# Patient Record
Sex: FEMALE | Race: BLACK OR AFRICAN AMERICAN | NOT HISPANIC OR LATINO | URBAN - METROPOLITAN AREA
[De-identification: names, ages, dates, MRNs, and addresses within clinical notes are randomized per-mention and may not be internally consistent; named-entity substitution may affect disease eponyms.]

---

## 2017-01-09 ENCOUNTER — EMERGENCY (EMERGENCY)
Facility: HOSPITAL | Age: 22
LOS: 1 days | Discharge: LEFT BEFORE DISCHARGE | End: 2017-01-09
Attending: EMERGENCY MEDICINE
Payer: MEDICAID

## 2017-01-09 VITALS
RESPIRATION RATE: 24 BRPM | HEART RATE: 91 BPM | DIASTOLIC BLOOD PRESSURE: 77 MMHG | TEMPERATURE: 98 F | OXYGEN SATURATION: 97 % | SYSTOLIC BLOOD PRESSURE: 118 MMHG

## 2017-01-09 DIAGNOSIS — R10.9 UNSPECIFIED ABDOMINAL PAIN: ICD-10-CM

## 2017-01-09 DIAGNOSIS — R06.4 HYPERVENTILATION: ICD-10-CM

## 2017-01-09 LAB
APPEARANCE UR: CLEAR — SIGNIFICANT CHANGE UP
BACTERIA # UR AUTO: ABNORMAL
BILIRUB UR-MCNC: NEGATIVE — SIGNIFICANT CHANGE UP
COLOR SPEC: YELLOW — SIGNIFICANT CHANGE UP
COMMENT - URINE: SIGNIFICANT CHANGE UP
DIFF PNL FLD: ABNORMAL
EPI CELLS # UR: SIGNIFICANT CHANGE UP
GLUCOSE UR QL: NEGATIVE MG/DL — SIGNIFICANT CHANGE UP
HCG UR QL: NEGATIVE — SIGNIFICANT CHANGE UP
KETONES UR-MCNC: ABNORMAL
LEUKOCYTE ESTERASE UR-ACNC: ABNORMAL
NITRITE UR-MCNC: NEGATIVE — SIGNIFICANT CHANGE UP
PH UR: 7 — SIGNIFICANT CHANGE UP (ref 4.8–8)
PROT UR-MCNC: 15 MG/DL
RBC CASTS # UR COMP ASSIST: ABNORMAL /HPF (ref 0–4)
SP GR SPEC: 1.01 — SIGNIFICANT CHANGE UP (ref 1.01–1.02)
UROBILINOGEN FLD QL: 4 MG/DL
WBC UR QL: SIGNIFICANT CHANGE UP

## 2017-01-09 PROCEDURE — 81001 URINALYSIS AUTO W/SCOPE: CPT

## 2017-01-09 PROCEDURE — 81025 URINE PREGNANCY TEST: CPT

## 2017-01-09 RX ORDER — ONDANSETRON 8 MG/1
4 TABLET, FILM COATED ORAL ONCE
Qty: 0 | Refills: 0 | Status: COMPLETED | OUTPATIENT
Start: 2017-01-09 | End: 2017-01-09

## 2017-01-09 RX ADMIN — ONDANSETRON 4 MILLIGRAM(S): 8 TABLET, FILM COATED ORAL at 02:25

## 2017-01-09 NOTE — ED PROVIDER NOTE - OBJECTIVE STATEMENT
20 y/o female with no pertinent medical hx presents to the ED c/o abd pain that onset today. Pt notes that she ate oat meal in the morning and had one episode of vomiting. Notes that sx improved mildly after episode of vomiting. Still notes mild abd pain. Denies numbness, tingling, fever, HA or chills. No further complaints at this time.

## 2017-01-09 NOTE — ED PROVIDER NOTE - MEDICAL DECISION MAKING DETAILS
Pt offered IV labs. Pt notes that she wishes to try PO medication first. Will UA, medicate and re-eval.

## 2017-01-09 NOTE — ED PROVIDER NOTE - NS ED MD SCRIBE ATTENDING SCRIBE SECTIONS
HISTORY OF PRESENT ILLNESS/PROGRESS NOTE/RESULTS/HIV/DISPOSITION/VITAL SIGNS( Pullset)/PHYSICAL EXAM/REVIEW OF SYSTEMS/PAST MEDICAL/SURGICAL/SOCIAL HISTORY/CONSULTATIONS/SHIFT CHANGE

## 2017-02-07 ENCOUNTER — EMERGENCY (EMERGENCY)
Facility: HOSPITAL | Age: 22
LOS: 1 days | Discharge: HOME | End: 2017-02-07
Attending: EMERGENCY MEDICINE
Payer: MEDICAID

## 2017-02-07 VITALS
SYSTOLIC BLOOD PRESSURE: 114 MMHG | HEIGHT: 67 IN | OXYGEN SATURATION: 100 % | WEIGHT: 149.91 LBS | HEART RATE: 99 BPM | TEMPERATURE: 98 F | DIASTOLIC BLOOD PRESSURE: 78 MMHG | RESPIRATION RATE: 20 BRPM

## 2017-02-07 DIAGNOSIS — N39.0 URINARY TRACT INFECTION, SITE NOT SPECIFIED: ICD-10-CM

## 2017-02-07 DIAGNOSIS — R19.7 DIARRHEA, UNSPECIFIED: ICD-10-CM

## 2017-02-07 PROCEDURE — 99284 EMERGENCY DEPT VISIT MOD MDM: CPT

## 2017-02-08 LAB
ALBUMIN SERPL ELPH-MCNC: 4.3 G/DL — SIGNIFICANT CHANGE UP (ref 3.3–5.2)
ALP SERPL-CCNC: 79 U/L — SIGNIFICANT CHANGE UP (ref 40–120)
ALT FLD-CCNC: 8 U/L — SIGNIFICANT CHANGE UP
ANION GAP SERPL CALC-SCNC: 14 MMOL/L — SIGNIFICANT CHANGE UP (ref 5–17)
APPEARANCE UR: ABNORMAL
AST SERPL-CCNC: 16 U/L — SIGNIFICANT CHANGE UP
BACTERIA # UR AUTO: ABNORMAL
BASOPHILS # BLD AUTO: 0 K/UL — SIGNIFICANT CHANGE UP (ref 0–0.2)
BASOPHILS NFR BLD AUTO: 0.2 % — SIGNIFICANT CHANGE UP (ref 0–2)
BILIRUB SERPL-MCNC: 1.4 MG/DL — SIGNIFICANT CHANGE UP (ref 0.4–2)
BILIRUB UR-MCNC: NEGATIVE — SIGNIFICANT CHANGE UP
BUN SERPL-MCNC: 13 MG/DL — SIGNIFICANT CHANGE UP (ref 8–20)
CALCIUM SERPL-MCNC: 9.1 MG/DL — SIGNIFICANT CHANGE UP (ref 8.6–10.2)
CHLORIDE SERPL-SCNC: 103 MMOL/L — SIGNIFICANT CHANGE UP (ref 98–107)
CO2 SERPL-SCNC: 23 MMOL/L — SIGNIFICANT CHANGE UP (ref 22–29)
COLOR SPEC: YELLOW — SIGNIFICANT CHANGE UP
CREAT SERPL-MCNC: 0.57 MG/DL — SIGNIFICANT CHANGE UP (ref 0.5–1.3)
DIFF PNL FLD: ABNORMAL
EOSINOPHIL # BLD AUTO: 0 K/UL — SIGNIFICANT CHANGE UP (ref 0–0.5)
EOSINOPHIL NFR BLD AUTO: 0.5 % — SIGNIFICANT CHANGE UP (ref 0–6)
EPI CELLS # UR: ABNORMAL
GLUCOSE SERPL-MCNC: 96 MG/DL — SIGNIFICANT CHANGE UP (ref 70–115)
GLUCOSE UR QL: NEGATIVE MG/DL — SIGNIFICANT CHANGE UP
HCG UR QL: NEGATIVE — SIGNIFICANT CHANGE UP
HCT VFR BLD CALC: 39.8 % — SIGNIFICANT CHANGE UP (ref 37–47)
HGB BLD-MCNC: 13.7 G/DL — SIGNIFICANT CHANGE UP (ref 12–16)
KETONES UR-MCNC: ABNORMAL
LEUKOCYTE ESTERASE UR-ACNC: ABNORMAL
LYMPHOCYTES # BLD AUTO: 0.9 K/UL — LOW (ref 1–4.8)
LYMPHOCYTES # BLD AUTO: 13.2 % — LOW (ref 20–55)
MCHC RBC-ENTMCNC: 33.3 PG — HIGH (ref 27–31)
MCHC RBC-ENTMCNC: 34.4 G/DL — SIGNIFICANT CHANGE UP (ref 32–36)
MCV RBC AUTO: 96.6 FL — SIGNIFICANT CHANGE UP (ref 81–99)
MONOCYTES # BLD AUTO: 0.3 K/UL — SIGNIFICANT CHANGE UP (ref 0–0.8)
MONOCYTES NFR BLD AUTO: 5.1 % — SIGNIFICANT CHANGE UP (ref 3–10)
NEUTROPHILS # BLD AUTO: 5.4 K/UL — SIGNIFICANT CHANGE UP (ref 1.8–8)
NEUTROPHILS NFR BLD AUTO: 80.8 % — HIGH (ref 37–73)
NITRITE UR-MCNC: NEGATIVE — SIGNIFICANT CHANGE UP
PH UR: 7 — SIGNIFICANT CHANGE UP (ref 4.8–8)
PLATELET # BLD AUTO: 179 K/UL — SIGNIFICANT CHANGE UP (ref 150–400)
POTASSIUM SERPL-MCNC: 3.5 MMOL/L — SIGNIFICANT CHANGE UP (ref 3.5–5.3)
POTASSIUM SERPL-SCNC: 3.5 MMOL/L — SIGNIFICANT CHANGE UP (ref 3.5–5.3)
PROT SERPL-MCNC: 7.5 G/DL — SIGNIFICANT CHANGE UP (ref 6.6–8.7)
PROT UR-MCNC: 30 MG/DL
RBC # BLD: 4.12 M/UL — LOW (ref 4.4–5.2)
RBC # FLD: 11.9 % — SIGNIFICANT CHANGE UP (ref 11–15.6)
RBC CASTS # UR COMP ASSIST: SIGNIFICANT CHANGE UP /HPF (ref 0–4)
SODIUM SERPL-SCNC: 140 MMOL/L — SIGNIFICANT CHANGE UP (ref 135–145)
SP GR SPEC: 1 — LOW (ref 1.01–1.02)
UROBILINOGEN FLD QL: 4 MG/DL
WBC # BLD: 6.66 K/UL — SIGNIFICANT CHANGE UP (ref 4.8–10.8)
WBC # FLD AUTO: 6.66 K/UL — SIGNIFICANT CHANGE UP (ref 4.8–10.8)
WBC UR QL: ABNORMAL

## 2017-02-08 PROCEDURE — 87086 URINE CULTURE/COLONY COUNT: CPT

## 2017-02-08 PROCEDURE — 81025 URINE PREGNANCY TEST: CPT

## 2017-02-08 PROCEDURE — 87491 CHLMYD TRACH DNA AMP PROBE: CPT

## 2017-02-08 PROCEDURE — 80053 COMPREHEN METABOLIC PANEL: CPT

## 2017-02-08 PROCEDURE — 81001 URINALYSIS AUTO W/SCOPE: CPT

## 2017-02-08 PROCEDURE — 85027 COMPLETE CBC AUTOMATED: CPT

## 2017-02-08 PROCEDURE — 96374 THER/PROPH/DIAG INJ IV PUSH: CPT

## 2017-02-08 PROCEDURE — 87591 N.GONORRHOEAE DNA AMP PROB: CPT

## 2017-02-08 PROCEDURE — 99284 EMERGENCY DEPT VISIT MOD MDM: CPT | Mod: 25

## 2017-02-08 PROCEDURE — 96375 TX/PRO/DX INJ NEW DRUG ADDON: CPT

## 2017-02-08 RX ORDER — PHENAZOPYRIDINE HCL 100 MG
2 TABLET ORAL
Qty: 12 | Refills: 0 | OUTPATIENT
Start: 2017-02-08 | End: 2017-02-10

## 2017-02-08 RX ORDER — KETOROLAC TROMETHAMINE 30 MG/ML
30 SYRINGE (ML) INJECTION ONCE
Qty: 0 | Refills: 0 | Status: DISCONTINUED | OUTPATIENT
Start: 2017-02-08 | End: 2017-02-08

## 2017-02-08 RX ORDER — CEFTRIAXONE 500 MG/1
1 INJECTION, POWDER, FOR SOLUTION INTRAMUSCULAR; INTRAVENOUS ONCE
Qty: 0 | Refills: 0 | Status: COMPLETED | OUTPATIENT
Start: 2017-02-08 | End: 2017-02-08

## 2017-02-08 RX ORDER — CEPHALEXIN 500 MG
1 CAPSULE ORAL
Qty: 40 | Refills: 0 | OUTPATIENT
Start: 2017-02-08 | End: 2017-02-18

## 2017-02-08 RX ORDER — SODIUM CHLORIDE 9 MG/ML
1000 INJECTION INTRAMUSCULAR; INTRAVENOUS; SUBCUTANEOUS ONCE
Qty: 0 | Refills: 0 | Status: COMPLETED | OUTPATIENT
Start: 2017-02-08 | End: 2017-02-08

## 2017-02-08 RX ADMIN — Medication 30 MILLIGRAM(S): at 00:14

## 2017-02-08 RX ADMIN — CEFTRIAXONE 100 GRAM(S): 500 INJECTION, POWDER, FOR SOLUTION INTRAMUSCULAR; INTRAVENOUS at 00:14

## 2017-02-08 RX ADMIN — Medication 30 MILLIGRAM(S): at 00:43

## 2017-02-08 RX ADMIN — SODIUM CHLORIDE 1000 MILLILITER(S): 9 INJECTION INTRAMUSCULAR; INTRAVENOUS; SUBCUTANEOUS at 00:43

## 2017-02-08 NOTE — ED PROVIDER NOTE - PROGRESS NOTE DETAILS
patient re-evaluated feeling much better, UA shows +UTI, send off UC and GC patient prefers to wait for results prior to being treated for STD, patient refused pelvic exam preferred to f/u with GYN.

## 2017-02-08 NOTE — ED PROVIDER NOTE - ATTENDING CONTRIBUTION TO CARE
21y old with dysuria, recent std, educated patient possible uti from chlamydia, eductaed patient, urine for culture, out patient hiv and std testing advised

## 2017-02-08 NOTE — ED PROVIDER NOTE - OBJECTIVE STATEMENT
This is a 21 year old female with c/o dysuria and urgency and frequency x 1 day.  Patient thinks she has urinary infection.  She notes recently was treated for trichomonas.  She reports is sexually active with one partner, denies any other past sexually transmitted diseases.  Patient notes pain is along lower pelvic region.  She denies any fevers or chills, back pain, n/v/d or any sick contacts or recent travel.

## 2017-02-09 LAB
C TRACH RRNA SPEC QL NAA+PROBE: SIGNIFICANT CHANGE UP
C TRACH RRNA SPEC QL NAA+PROBE: SIGNIFICANT CHANGE UP
CULTURE RESULTS: SIGNIFICANT CHANGE UP
GC AMPLIFICATION INTERPRETATION: SIGNIFICANT CHANGE UP
N GONORRHOEA RRNA SPEC QL NAA+PROBE: SIGNIFICANT CHANGE UP
SPECIMEN SOURCE: SIGNIFICANT CHANGE UP
SPECIMEN SOURCE: SIGNIFICANT CHANGE UP

## 2018-06-04 ENCOUNTER — EMERGENCY (EMERGENCY)
Facility: HOSPITAL | Age: 23
LOS: 1 days | Discharge: HOME | End: 2018-06-04
Attending: EMERGENCY MEDICINE
Payer: MEDICAID

## 2018-06-04 VITALS
OXYGEN SATURATION: 100 % | HEART RATE: 86 BPM | RESPIRATION RATE: 16 BRPM | TEMPERATURE: 99 F | SYSTOLIC BLOOD PRESSURE: 129 MMHG | DIASTOLIC BLOOD PRESSURE: 87 MMHG

## 2018-06-04 LAB
APPEARANCE UR: ABNORMAL
BACTERIA # UR AUTO: ABNORMAL
BILIRUB UR-MCNC: NEGATIVE — SIGNIFICANT CHANGE UP
COLOR SPEC: YELLOW — SIGNIFICANT CHANGE UP
DIFF PNL FLD: ABNORMAL
EPI CELLS # UR: ABNORMAL
GLUCOSE UR QL: NEGATIVE MG/DL — SIGNIFICANT CHANGE UP
HCG UR QL: NEGATIVE — SIGNIFICANT CHANGE UP
KETONES UR-MCNC: NEGATIVE — SIGNIFICANT CHANGE UP
LEUKOCYTE ESTERASE UR-ACNC: ABNORMAL
NITRITE UR-MCNC: NEGATIVE — SIGNIFICANT CHANGE UP
PH UR: 7 — SIGNIFICANT CHANGE UP (ref 5–8)
PROT UR-MCNC: 30 MG/DL
RBC CASTS # UR COMP ASSIST: ABNORMAL /HPF (ref 0–4)
SP GR SPEC: 1.01 — SIGNIFICANT CHANGE UP (ref 1.01–1.02)
UROBILINOGEN FLD QL: 8 MG/DL
WBC UR QL: ABNORMAL

## 2018-06-04 PROCEDURE — 96372 THER/PROPH/DIAG INJ SC/IM: CPT

## 2018-06-04 PROCEDURE — 76830 TRANSVAGINAL US NON-OB: CPT | Mod: 26

## 2018-06-04 PROCEDURE — 87086 URINE CULTURE/COLONY COUNT: CPT

## 2018-06-04 PROCEDURE — 76856 US EXAM PELVIC COMPLETE: CPT | Mod: 26

## 2018-06-04 PROCEDURE — 81025 URINE PREGNANCY TEST: CPT

## 2018-06-04 PROCEDURE — 81001 URINALYSIS AUTO W/SCOPE: CPT

## 2018-06-04 PROCEDURE — 99284 EMERGENCY DEPT VISIT MOD MDM: CPT | Mod: 25

## 2018-06-04 PROCEDURE — 76830 TRANSVAGINAL US NON-OB: CPT

## 2018-06-04 PROCEDURE — 76856 US EXAM PELVIC COMPLETE: CPT

## 2018-06-04 PROCEDURE — 99284 EMERGENCY DEPT VISIT MOD MDM: CPT

## 2018-06-04 RX ORDER — ACETAMINOPHEN 500 MG
650 TABLET ORAL ONCE
Qty: 0 | Refills: 0 | Status: COMPLETED | OUTPATIENT
Start: 2018-06-04 | End: 2018-06-04

## 2018-06-04 RX ORDER — KETOROLAC TROMETHAMINE 30 MG/ML
30 SYRINGE (ML) INJECTION ONCE
Qty: 0 | Refills: 0 | Status: DISCONTINUED | OUTPATIENT
Start: 2018-06-04 | End: 2018-06-04

## 2018-06-04 RX ADMIN — Medication 650 MILLIGRAM(S): at 20:50

## 2018-06-04 RX ADMIN — Medication 30 MILLIGRAM(S): at 19:26

## 2018-06-04 NOTE — ED ADULT NURSE NOTE - OBJECTIVE STATEMENT
assumed care of patient at this time. 23 y/o female presents to ed for abdominal pain. reports dysuria. patient awake alert and oriented x3. urine collected and sent. patietn denies any medical hx. awaiting urine results. updated regarding plan of care. verbalized understanding.

## 2018-06-04 NOTE — ED ADULT NURSE REASSESSMENT NOTE - NS ED NURSE REASSESS COMMENT FT1
assumed pt, pt alert and oriented x4 c/o pain 9/10 to back and abdomen tylenol given. pt in no distress, respirations even unlabored, will continue to monitor.

## 2018-06-04 NOTE — ED PROVIDER NOTE - OBJECTIVE STATEMENT
32 y/o F with no pertinent medical hx presents to the ED with c/o lower abd pain that started around [id number]. Pt notes that the pain began about an hour after her period started. Pt states that pain was cramping-like, and has subsided significantly. She mentions that the pain has mostly resolved. Notes she is concerned because her period arrived 2 days late. Denies diarrhea, fever. No further complaints at this time.

## 2018-06-04 NOTE — ED ADULT NURSE NOTE - CHIEF COMPLAINT QUOTE
PT BIBA c/o abdominal pain due to menstrual cramps. Pt is crying in triage, states she need a muscle relaxer. LMP prior to this one was 5/7 and OB/GYN is Berlin Lema, from [facility].

## 2018-06-04 NOTE — ED PROVIDER NOTE - PROGRESS NOTE DETAILS
Pt was calm and in no pain for the past few hours but suddenly started having lower abd and pelvic pain. Will treat pain and do pelvic US to r/o pelvic torsion. Pt. re-evaluated. Pt. feeling better. Pain is less. US report discussed with patient.

## 2018-06-04 NOTE — ED ADULT TRIAGE NOTE - CHIEF COMPLAINT QUOTE
PT BIBA c/o abdominal pain due to menstrual cramps. Pt is crying in triage, states she need a muscle relaxer. LMP prior to this one was 5/7 and OB/GYN is Berlin Lema, from [facility].

## 2018-06-05 LAB
CULTURE RESULTS: NO GROWTH — SIGNIFICANT CHANGE UP
SPECIMEN SOURCE: SIGNIFICANT CHANGE UP

## 2020-12-28 NOTE — ED ADULT NURSE NOTE - NS TRANSFER PATIENT BELONGINGS
Care Transitions subsequent Follow Up Call    Call within 2 business days of discharge: Yes     Patient: Regi Jolly Patient : 1984 MRN: [id number]    Last Discharge [address]       Complaint Diagnosis Description Type Department Provider    20 Scheduled Induction  Admission (Discharged) [id number] Kay Houston MD          Maternity Care Manager contacted the patient by telephone for follow-up call. Verified name and  with patient as identifiers. Risk Factors Identified: dvt    Needs to be reviewed by the provider   none         Method of communication with provider : none      Advance Care Planning:   Does patient have an Advance Directive: not on file     Does patient have OB/Gyn Selected? Yes    Discussed follow up appointments. If no appointment was previously scheduled, appointment scheduling offered: Yes  [facility] follow up appointment(s):   Future Appointments   Date Time Provider Chris Ann   2021  7:30 PM Kay Houston MD [facility]     [facility] follow up appointment(s):     OB History:   OB History    Para Term  AB Living   3 3 3     1   SAB TAB Ectopic Molar Multiple Live Births           0 1      # Outcome Date GA Lbr Go/2nd Weight Sex Delivery Anes PTL Lv   3 Term 20 39w3d / 00:09 8 lb 0.4 oz (3.64 kg) M Vag-Spont EPI N NINOSKA   2 Term 17 40w2d  7 lb 9 oz (3.43 kg) F Vag-Spont      1 Term 10/07/14 40w6d  7 lb 1 oz (3.204 kg) F Vag-Spont          Unknown    Medication reconciliation was performed with patient, who verbalizes understanding of administration of home medications. Advised obtaining a 90-day supply of all daily and as-needed medications. Barriers/Support system:  patient       Postpartum Assessment:  Vaginal, pt doing well 3 weeks pp, just tired, provided some suggestions for getting baby to sleep longer, pt will try.  She also has a 1year old and a 10year old at home as well and it is just her  who is back at work. No further questions or concerns at this time. Will continue to monitor after 6 week appointment    Patient given an opportunity to ask questions and does not have any further questions or concerns at this time. Reviewed appropriate site of care based on symptoms and resources available to patient including: When to call 911 and Condition related references. The patient agrees to contact the OB/Gyn office for questions related to their healthcare. Plan for follow-up call in 10-14 days based on severity of symptoms and risk factors.   Plan for next call: self management-pp 6 week appointment Clothing

## 2021-02-17 ENCOUNTER — EMERGENCY (EMERGENCY)
Facility: HOSPITAL | Age: 26
LOS: 1 days | Discharge: HOME | End: 2021-02-17
Attending: STUDENT IN AN ORGANIZED HEALTH CARE EDUCATION/TRAINING PROGRAM
Payer: MEDICAID

## 2021-02-17 VITALS
WEIGHT: 154.98 LBS | HEART RATE: 97 BPM | RESPIRATION RATE: 20 BRPM | TEMPERATURE: 98 F | HEIGHT: 67 IN | OXYGEN SATURATION: 100 % | SYSTOLIC BLOOD PRESSURE: 100 MMHG | DIASTOLIC BLOOD PRESSURE: 66 MMHG

## 2021-02-17 PROCEDURE — 73630 X-RAY EXAM OF FOOT: CPT

## 2021-02-17 PROCEDURE — 73610 X-RAY EXAM OF ANKLE: CPT | Mod: 26,LT

## 2021-02-17 PROCEDURE — 99283 EMERGENCY DEPT VISIT LOW MDM: CPT

## 2021-02-17 PROCEDURE — 99283 EMERGENCY DEPT VISIT LOW MDM: CPT | Mod: 25

## 2021-02-17 PROCEDURE — 73630 X-RAY EXAM OF FOOT: CPT | Mod: 26,LT

## 2021-02-17 PROCEDURE — 73610 X-RAY EXAM OF ANKLE: CPT

## 2021-02-17 RX ORDER — ACETAMINOPHEN 500 MG
650 TABLET ORAL ONCE
Refills: 0 | Status: COMPLETED | OUTPATIENT
Start: 2021-02-17 | End: 2021-02-17

## 2021-02-17 RX ADMIN — Medication 650 MILLIGRAM(S): at 12:07

## 2021-02-17 NOTE — ED PROVIDER NOTE - ATTENDING CONTRIBUTION TO CARE
25 yof denies PMH here with left ankle pain after missing a step while walking and twisted ankle. Pain to dorsum of foot. No obvious swelling. Denies any other injury  AP - xray r/o fx. pain control. supportive care. likely outpatient ortho f/u

## 2021-02-17 NOTE — ED PROVIDER NOTE - OBJECTIVE STATEMENT
Pt is a 26 y/o f, denies PMH, presents to ED c/o L ankle / foot pain s/p injury. Pt reports missing a step and twisting her L ankle. Pt reports pain radiating down her L ankle into the top of her foot. Pt has not taken any medication in attempt to alleviate her symptoms. Pt did not strike her head or sustain any other injuries during the event. Pt denies paresthesias, cold digits, loss of function of foot.

## 2021-02-17 NOTE — ED PROVIDER NOTE - PATIENT PORTAL LINK FT
You can access the FollowMyHealth Patient Portal offered by [facility] by registering at the following website: [email or web address]. By joining [employer]’s FollowMyHealth portal, you will also be able to view your health information using other applications (apps) compatible with our system.

## 2021-02-17 NOTE — ED PROVIDER NOTE - PHYSICAL EXAMINATION
MSK: Full ROM and 5/5 str LLE. Moving digits 1-5 L foot. + TTP dorsal aspect L foot.     Vasc: cap  refill < 2 secs. + 2 pedal pulses.

## 2021-02-17 NOTE — ED PROVIDER NOTE - PROGRESS NOTE DETAILS
PA NOTE: No acute fx appreciated on imaging. Pt educated on RICE therapy. ACE wrap applied to ankle and pt provided with crutches. Advised pt to follow up with Dr. Venegas upon discharge if symptoms persist.

## 2021-02-17 NOTE — ED PROVIDER NOTE - NS ED MD EM SELECTION
Problem: Patient Care Overview  Goal: Plan of Care Review  Outcome: Ongoing (interventions implemented as appropriate)  Flowsheets (Taken 3/2/2020 1246)  Plan of Care Reviewed With: patient  Outcome Summary: Pt unable to verbalize nor maintain sternal precautions despite cues. Trialed standing with A of 2 with knee block but pt unable to get fully upright due to marked BLE mm weakness. Pt will need extensive assistance and care upon d/c.       [id number] Exp Problem Focused - Mod. Complex

## 2021-02-17 NOTE — ED PROVIDER NOTE - CARE PROVIDER_API CALL
Arti Venegas)  Orthopedics  [address] [place] [address], [address]  [place] [address]  Phone: [phone]  Fax: [phone]  Follow Up Time:

## 2021-02-24 ENCOUNTER — APPOINTMENT (OUTPATIENT)
Dept: ORTHOPEDIC SURGERY | Facility: CLINIC | Age: 26
End: 2021-02-24
Payer: OTHER MISCELLANEOUS

## 2021-02-24 DIAGNOSIS — S99.912A UNSPECIFIED INJURY OF LEFT ANKLE, INITIAL ENCOUNTER: ICD-10-CM

## 2021-02-24 DIAGNOSIS — M25.572 PAIN IN LEFT ANKLE AND JOINTS OF LEFT FOOT: ICD-10-CM

## 2021-02-24 PROCEDURE — 99204 OFFICE O/P NEW MOD 45 MIN: CPT | Mod: 25

## 2021-02-24 PROCEDURE — 97760 ORTHOTIC MGMT&TRAING 1ST ENC: CPT

## 2021-02-24 NOTE — ADDENDUM
[FreeTextEntry1] : I, Alberto Rob, acted solely as a scribe for Dr. Lorenzo Soni on this date 02/24/2021 .\par All medical record entries made by the Scribe were at my, Dr. Lorenzo Soni, direction and personally dictated by me on 02/24/2021 . I have reviewed the chart and agree that the record accurately reflects my personal performance of the history, physical exam, assessment and plan. I have also personally directed, reviewed, and agreed with the chart.

## 2021-02-24 NOTE — CONSULT LETTER
[Consult Letter:] : I had the pleasure of evaluating your patient, [unfilled]. [Please see my note below.] : Please see my note below. [Consult Closing:] : Thank you very much for allowing me to participate in the care of this patient.  If you have any questions, please do not hesitate to contact me. [Sincerely,] : Sincerely, [FreeTextEntry3] : Lorenzo Soni, DO\par Foot and Ankle Surgery\par

## 2021-02-24 NOTE — DISCUSSION/SUMMARY
[de-identified] : Today I had a lengthy discussion with the patient regarding their left ankle pain. I have addressed all the patient's concerns surrounding the pathology of their condition. I recommend that the patient utilize a CAM boot. The patient was fitted with a CAM boot in the office today. The patient was educated about the boot wear pattern and utilization, as well as the timeframe to come out of the boot. She was also given full instructions for using the boot. I recommend that the patient utilize ice, NSAIDS PRN, and heat. She can also elevate the left ankle above the level of the heart. I would like to see the patient back in the office in 2 weeks to reassess their condition, and will consider obtaining an MRI at that time if there is no improvement. The patient understood and verbally agreed to the treatment plan. All of their questions were answered and they were satisfied with the visit. The patient should call the office if they have any questions or experience worsening symptoms.

## 2021-02-24 NOTE — PHYSICAL EXAM
[de-identified] : General: Alert and oriented x3. In no acute distress. Pleasant in nature with a normal affect. No apparent respiratory distress.\par \par L Ankle Exam\par Skin: Clean, dry, intact\par Inspection: No obvious malalignment, no swelling, no effusion; no lymphadenopathy\par Pulses: 2+ DP/PT pulses\par ROM: L Ankle neutral degrees of dorsiflexion, 30 degrees of plantarflexion, 5 degrees of subtalar motion\par Tenderness: +pain to hindfoot. +medial and lateral ankle pain. No tenderness over the lateral malleolus, no CFL/ATFL/PTFL pain. No medial malleolus pain, no deltoid ligament pain. No proximal fibular pain. No heel pain.\par Stability: Negative anterior/posterior drawer.\par Strength: 5/5 TA/GS/EHL\par Neuro: In tact to light touch throughout\par Additional tests: Negative Mortons test, Negative syndesmosis squeeze test.  [de-identified] : X-rays of the left ankle and left foot obtained from [facility] on 2/17/2021 and reviewed by me today 02/24/2021. Revealed: Negative studies.

## 2021-02-24 NOTE — HISTORY OF PRESENT ILLNESS
[FreeTextEntry1] : 25 year old female presenting with left ankle pain. Pain scale noted to be 5/10. The patient was involved in a work-related injury on 2/17/2021. The patient works at [employer], and states she slipped on the curb as she was starting work. She states she was wearing an Ugg boot during the injury. She states the first 3 days after the injury she was worse, but is now improving. The patient went to [facility] the day of the injury and had x-rays. The patient presents ambulating with a cane. She is currently taking no pain medication. No other complaints at this time.

## 2021-03-12 ENCOUNTER — APPOINTMENT (OUTPATIENT)
Dept: ORTHOPEDIC SURGERY | Facility: CLINIC | Age: 26
End: 2021-03-12

## 2021-03-25 ENCOUNTER — APPOINTMENT (OUTPATIENT)
Dept: OBGYN | Facility: CLINIC | Age: 26
End: 2021-03-25

## 2021-05-05 ENCOUNTER — EMERGENCY (EMERGENCY)
Facility: HOSPITAL | Age: 26
LOS: 1 days | Discharge: HOME | End: 2021-05-05
Attending: EMERGENCY MEDICINE
Payer: MEDICAID

## 2021-05-05 VITALS
TEMPERATURE: 97 F | SYSTOLIC BLOOD PRESSURE: 126 MMHG | HEART RATE: 92 BPM | DIASTOLIC BLOOD PRESSURE: 90 MMHG | RESPIRATION RATE: 20 BRPM | HEIGHT: 67 IN | OXYGEN SATURATION: 97 %

## 2021-05-05 LAB
ALBUMIN SERPL ELPH-MCNC: 4.3 G/DL — SIGNIFICANT CHANGE UP (ref 3.3–5.2)
ALP SERPL-CCNC: 74 U/L — SIGNIFICANT CHANGE UP (ref 40–120)
ALT FLD-CCNC: 10 U/L — SIGNIFICANT CHANGE UP
ANION GAP SERPL CALC-SCNC: 17 MMOL/L — SIGNIFICANT CHANGE UP (ref 5–17)
AST SERPL-CCNC: 18 U/L — SIGNIFICANT CHANGE UP
BILIRUB SERPL-MCNC: 0.6 MG/DL — SIGNIFICANT CHANGE UP (ref 0.4–2)
BUN SERPL-MCNC: 9 MG/DL — SIGNIFICANT CHANGE UP (ref 8–20)
CALCIUM SERPL-MCNC: 9.5 MG/DL — SIGNIFICANT CHANGE UP (ref 8.6–10.2)
CHLORIDE SERPL-SCNC: 103 MMOL/L — SIGNIFICANT CHANGE UP (ref 98–107)
CO2 SERPL-SCNC: 19 MMOL/L — LOW (ref 22–29)
CREAT SERPL-MCNC: 0.74 MG/DL — SIGNIFICANT CHANGE UP (ref 0.5–1.3)
GLUCOSE SERPL-MCNC: 89 MG/DL — SIGNIFICANT CHANGE UP (ref 70–99)
HCG SERPL-ACNC: <4 MIU/ML — SIGNIFICANT CHANGE UP
HCT VFR BLD CALC: 40.9 % — SIGNIFICANT CHANGE UP (ref 34.5–45)
HGB BLD-MCNC: 14 G/DL — SIGNIFICANT CHANGE UP (ref 11.5–15.5)
MCHC RBC-ENTMCNC: 34.2 GM/DL — SIGNIFICANT CHANGE UP (ref 32–36)
MCHC RBC-ENTMCNC: 34.3 PG — HIGH (ref 27–34)
MCV RBC AUTO: 100.2 FL — HIGH (ref 80–100)
PLATELET # BLD AUTO: 200 K/UL — SIGNIFICANT CHANGE UP (ref 150–400)
POTASSIUM SERPL-MCNC: 3.6 MMOL/L — SIGNIFICANT CHANGE UP (ref 3.5–5.3)
POTASSIUM SERPL-SCNC: 3.6 MMOL/L — SIGNIFICANT CHANGE UP (ref 3.5–5.3)
PROT SERPL-MCNC: 7.1 G/DL — SIGNIFICANT CHANGE UP (ref 6.6–8.7)
RBC # BLD: 4.08 M/UL — SIGNIFICANT CHANGE UP (ref 3.8–5.2)
RBC # FLD: 11.3 % — SIGNIFICANT CHANGE UP (ref 10.3–14.5)
SODIUM SERPL-SCNC: 139 MMOL/L — SIGNIFICANT CHANGE UP (ref 135–145)
WBC # BLD: 8.2 K/UL — SIGNIFICANT CHANGE UP (ref 3.8–10.5)
WBC # FLD AUTO: 8.2 K/UL — SIGNIFICANT CHANGE UP (ref 3.8–10.5)

## 2021-05-05 PROCEDURE — 76856 US EXAM PELVIC COMPLETE: CPT

## 2021-05-05 PROCEDURE — 76830 TRANSVAGINAL US NON-OB: CPT

## 2021-05-05 PROCEDURE — 84702 CHORIONIC GONADOTROPIN TEST: CPT

## 2021-05-05 PROCEDURE — 76856 US EXAM PELVIC COMPLETE: CPT | Mod: 26,59

## 2021-05-05 PROCEDURE — 36415 COLL VENOUS BLD VENIPUNCTURE: CPT

## 2021-05-05 PROCEDURE — 99284 EMERGENCY DEPT VISIT MOD MDM: CPT

## 2021-05-05 PROCEDURE — 80053 COMPREHEN METABOLIC PANEL: CPT

## 2021-05-05 PROCEDURE — 76830 TRANSVAGINAL US NON-OB: CPT | Mod: 26

## 2021-05-05 PROCEDURE — 85027 COMPLETE CBC AUTOMATED: CPT

## 2021-05-05 PROCEDURE — 96374 THER/PROPH/DIAG INJ IV PUSH: CPT

## 2021-05-05 PROCEDURE — 93975 VASCULAR STUDY: CPT

## 2021-05-05 PROCEDURE — 99284 EMERGENCY DEPT VISIT MOD MDM: CPT | Mod: 25

## 2021-05-05 PROCEDURE — 93975 VASCULAR STUDY: CPT | Mod: 26

## 2021-05-05 RX ORDER — SODIUM CHLORIDE 9 MG/ML
1000 INJECTION INTRAMUSCULAR; INTRAVENOUS; SUBCUTANEOUS ONCE
Refills: 0 | Status: COMPLETED | OUTPATIENT
Start: 2021-05-05 | End: 2021-05-05

## 2021-05-05 RX ORDER — KETOROLAC TROMETHAMINE 30 MG/ML
15 SYRINGE (ML) INJECTION ONCE
Refills: 0 | Status: DISCONTINUED | OUTPATIENT
Start: 2021-05-05 | End: 2021-05-05

## 2021-05-05 RX ADMIN — Medication 15 MILLIGRAM(S): at 10:00

## 2021-05-05 RX ADMIN — SODIUM CHLORIDE 1000 MILLILITER(S): 9 INJECTION INTRAMUSCULAR; INTRAVENOUS; SUBCUTANEOUS at 10:32

## 2021-05-05 RX ADMIN — Medication 15 MILLIGRAM(S): at 10:32

## 2021-05-05 RX ADMIN — SODIUM CHLORIDE 1000 MILLILITER(S): 9 INJECTION INTRAMUSCULAR; INTRAVENOUS; SUBCUTANEOUS at 10:00

## 2021-05-05 NOTE — ED PROVIDER NOTE - OBJECTIVE STATEMENT
Patient is a 25 year old female who presents c/o lower abdominal pain.  patient states menstrual cycle started this am along with pain. patient states has had this pain in past due to cyst.  Patient has not followed up with OB in past.  no n/v/d, no fevers, no chills

## 2021-05-05 NOTE — ED PROVIDER NOTE - PATIENT PORTAL LINK FT
You can access the FollowMyHealth Patient Portal offered by [facility] by registering at the following website: [email or web address]. By joining [employer]’s FollowMyHealth portal, you will also be able to view your health information using other applications (apps) compatible with our system.

## 2021-05-05 NOTE — ED PROVIDER NOTE - NSFOLLOWUPINSTRUCTIONS_ED_ALL_ED_FT

## 2021-05-05 NOTE — ED ADULT NURSE NOTE - OBJECTIVE STATEMENT
25 F, nad, alert and oriented x 3 c/o lower abdominal cramping onset with her menstrual cycle this morning, reports she usually comes to ED for this pain, has not followed up with gyn. Physical exam deferred to ELDA Ruelas who is at bedside, await orders. Pt c/o of dysmenorrhea

## 2021-05-05 NOTE — ED ADULT TRIAGE NOTE - CHIEF COMPLAINT QUOTE
pt BIBA a&ox3, no acute distress, in pain c/o lower abdominal pain since this morning, reports she started with her period this morning. reports these menstruation cramps are normal for her period. does not take medications. denies more bleeding than normal.

## 2021-07-19 ENCOUNTER — EMERGENCY (EMERGENCY)
Facility: HOSPITAL | Age: 26
LOS: 1 days | Discharge: HOME | End: 2021-07-19
Attending: STUDENT IN AN ORGANIZED HEALTH CARE EDUCATION/TRAINING PROGRAM
Payer: MEDICAID

## 2021-07-19 VITALS
TEMPERATURE: 98 F | OXYGEN SATURATION: 99 % | SYSTOLIC BLOOD PRESSURE: 147 MMHG | HEIGHT: 67 IN | HEART RATE: 76 BPM | DIASTOLIC BLOOD PRESSURE: 74 MMHG | RESPIRATION RATE: 18 BRPM

## 2021-07-19 LAB
ALBUMIN SERPL ELPH-MCNC: 4.4 G/DL — SIGNIFICANT CHANGE UP (ref 3.3–5.2)
ALP SERPL-CCNC: 75 U/L — SIGNIFICANT CHANGE UP (ref 40–120)
ALT FLD-CCNC: 9 U/L — SIGNIFICANT CHANGE UP
ANION GAP SERPL CALC-SCNC: 19 MMOL/L — HIGH (ref 5–17)
AST SERPL-CCNC: 17 U/L — SIGNIFICANT CHANGE UP
BASOPHILS # BLD AUTO: 0.03 K/UL — SIGNIFICANT CHANGE UP (ref 0–0.2)
BASOPHILS NFR BLD AUTO: 0.4 % — SIGNIFICANT CHANGE UP (ref 0–2)
BILIRUB SERPL-MCNC: 0.6 MG/DL — SIGNIFICANT CHANGE UP (ref 0.4–2)
BUN SERPL-MCNC: 11.1 MG/DL — SIGNIFICANT CHANGE UP (ref 8–20)
CALCIUM SERPL-MCNC: 10.1 MG/DL — SIGNIFICANT CHANGE UP (ref 8.6–10.2)
CHLORIDE SERPL-SCNC: 101 MMOL/L — SIGNIFICANT CHANGE UP (ref 98–107)
CO2 SERPL-SCNC: 17 MMOL/L — LOW (ref 22–29)
CREAT SERPL-MCNC: 0.84 MG/DL — SIGNIFICANT CHANGE UP (ref 0.5–1.3)
EOSINOPHIL # BLD AUTO: 0.12 K/UL — SIGNIFICANT CHANGE UP (ref 0–0.5)
EOSINOPHIL NFR BLD AUTO: 1.4 % — SIGNIFICANT CHANGE UP (ref 0–6)
GLUCOSE SERPL-MCNC: 86 MG/DL — SIGNIFICANT CHANGE UP (ref 70–99)
HCG SERPL-ACNC: <4 MIU/ML — SIGNIFICANT CHANGE UP
HCT VFR BLD CALC: 41.4 % — SIGNIFICANT CHANGE UP (ref 34.5–45)
HGB BLD-MCNC: 14 G/DL — SIGNIFICANT CHANGE UP (ref 11.5–15.5)
IMM GRANULOCYTES NFR BLD AUTO: 0.2 % — SIGNIFICANT CHANGE UP (ref 0–1.5)
LIDOCAIN IGE QN: 39 U/L — SIGNIFICANT CHANGE UP (ref 22–51)
LYMPHOCYTES # BLD AUTO: 2.03 K/UL — SIGNIFICANT CHANGE UP (ref 1–3.3)
LYMPHOCYTES # BLD AUTO: 24.4 % — SIGNIFICANT CHANGE UP (ref 13–44)
MCHC RBC-ENTMCNC: 33.8 GM/DL — SIGNIFICANT CHANGE UP (ref 32–36)
MCHC RBC-ENTMCNC: 34.1 PG — HIGH (ref 27–34)
MCV RBC AUTO: 101 FL — HIGH (ref 80–100)
MONOCYTES # BLD AUTO: 0.52 K/UL — SIGNIFICANT CHANGE UP (ref 0–0.9)
MONOCYTES NFR BLD AUTO: 6.3 % — SIGNIFICANT CHANGE UP (ref 2–14)
NEUTROPHILS # BLD AUTO: 5.59 K/UL — SIGNIFICANT CHANGE UP (ref 1.8–7.4)
NEUTROPHILS NFR BLD AUTO: 67.3 % — SIGNIFICANT CHANGE UP (ref 43–77)
PLATELET # BLD AUTO: 192 K/UL — SIGNIFICANT CHANGE UP (ref 150–400)
POTASSIUM SERPL-MCNC: 3.7 MMOL/L — SIGNIFICANT CHANGE UP (ref 3.5–5.3)
POTASSIUM SERPL-SCNC: 3.7 MMOL/L — SIGNIFICANT CHANGE UP (ref 3.5–5.3)
PROT SERPL-MCNC: 7.4 G/DL — SIGNIFICANT CHANGE UP (ref 6.6–8.7)
RBC # BLD: 4.1 M/UL — SIGNIFICANT CHANGE UP (ref 3.8–5.2)
RBC # FLD: 11.3 % — SIGNIFICANT CHANGE UP (ref 10.3–14.5)
SODIUM SERPL-SCNC: 137 MMOL/L — SIGNIFICANT CHANGE UP (ref 135–145)
WBC # BLD: 8.31 K/UL — SIGNIFICANT CHANGE UP (ref 3.8–10.5)
WBC # FLD AUTO: 8.31 K/UL — SIGNIFICANT CHANGE UP (ref 3.8–10.5)

## 2021-07-19 PROCEDURE — 99285 EMERGENCY DEPT VISIT HI MDM: CPT

## 2021-07-19 RX ORDER — ONDANSETRON 8 MG/1
4 TABLET, FILM COATED ORAL ONCE
Refills: 0 | Status: COMPLETED | OUTPATIENT
Start: 2021-07-19 | End: 2021-07-19

## 2021-07-19 RX ORDER — MORPHINE SULFATE 50 MG/1
4 CAPSULE, EXTENDED RELEASE ORAL ONCE
Refills: 0 | Status: DISCONTINUED | OUTPATIENT
Start: 2021-07-19 | End: 2021-07-19

## 2021-07-19 RX ORDER — SODIUM CHLORIDE 9 MG/ML
1000 INJECTION INTRAMUSCULAR; INTRAVENOUS; SUBCUTANEOUS ONCE
Refills: 0 | Status: COMPLETED | OUTPATIENT
Start: 2021-07-19 | End: 2021-07-19

## 2021-07-19 RX ADMIN — MORPHINE SULFATE 4 MILLIGRAM(S): 50 CAPSULE, EXTENDED RELEASE ORAL at 21:28

## 2021-07-19 RX ADMIN — SODIUM CHLORIDE 1000 MILLILITER(S): 9 INJECTION INTRAMUSCULAR; INTRAVENOUS; SUBCUTANEOUS at 21:29

## 2021-07-19 RX ADMIN — ONDANSETRON 4 MILLIGRAM(S): 8 TABLET, FILM COATED ORAL at 21:29

## 2021-07-19 NOTE — ED PROVIDER NOTE - PROGRESS NOTE DETAILS
Laura Lovett for ED attending, Dr. Rm: given pt pain  and unclear if pt in pregnant; discussed risk benefit of morphine for pain control and agrees to its administration Laura Lovett for ED attending, Dr. Rm: Pt is feeling better after medication. Patient stable. Lab and US are as noted.

## 2021-07-19 NOTE — ED PROVIDER NOTE - CONSTITUTIONAL, MLM
Well appearing, awake, alert, oriented to person, place, time/situation, moderate to severe painful distress, pt crying normal...

## 2021-07-19 NOTE — ED PROVIDER NOTE - CARE PLAN
Principal Discharge DX:	Hemorrhagic cyst of ovary   Principal Discharge DX:	Hemorrhagic cyst of ovary  Secondary Diagnosis:	Acute cystitis with hematuria

## 2021-07-19 NOTE — ED PROVIDER NOTE - OBJECTIVE STATEMENT
24 y/o female with PMHx of ovarian cysts p/w 1 day of significant abdominal pain. Pt states she has hx of painful period, hx of significant cramping. Pt states she has been her before had imaging that showed ruptured cyst, otherwise normal. Pt took Ibuprofen 2 hours ago with no changes to symptoms. Pt supposed to be seen by GYN and started on birth control to see if that would help her symptoms. Pt missed her appointment, presents at this time to see if ED can help with pain control. Pt states pain is primarily on left side above pelvic region which is were her pain normally is. pt states that her period is normal in term of bleeding. Pt denies headache, syncope, trouble breathing

## 2021-07-19 NOTE — ED PROVIDER NOTE - PATIENT PORTAL LINK FT
You can access the FollowMyHealth Patient Portal offered by [facility] by registering at the following website: [email or web address]. By joining [employer]’s FollowMyHealth portal, you will also be able to view your health information using other applications (apps) compatible with our system.

## 2021-07-19 NOTE — ED PROVIDER NOTE - NSFOLLOWUPINSTRUCTIONS_ED_ALL_ED_FT
1) Follow up with your ob/gyn in 1-2 days  2) Return to the ER for worsening or concerning symptoms      Ovarian Cyst    WHAT YOU NEED TO KNOW:    An ovarian cyst is a fluid-filled sac that grows in or on an ovary. You have 2 ovaries, 1 on each side of your uterus. They are small, about the shape of an almond. Ovarian cysts are common in women who have regular monthly cycles. During your monthly cycle, eggs are released from the ovaries. The cyst usually contains fluid but may sometimes have blood or tissue in it. Most ovarian cysts are harmless and go away without treatment in a few months. Some cysts can grow large, cause pain, or break open.     Female Reproductive System         DISCHARGE INSTRUCTIONS:    Call your local emergency number (911 in the [place]) if:   •You have severe pain with fever and vomiting.      •You have sudden, severe abdominal pain.      •You are too weak, faint, or dizzy to stand up.      •You are breathing very quickly.      Call your doctor or gynecologist if:   •Your periods are early, late, or more painful than usual.      •You have questions or concerns about your condition or care.      Medicines: You may need any of the following:   •Birth control pills may help control your monthly cycle, prevent cysts, or cause them to shrink.      •Acetaminophen decreases pain and fever. It is available without a doctor's order. Ask how much to take and how often to take it. Follow directions. Read the labels of all other medicines you are using to see if they also contain acetaminophen, or ask your doctor or pharmacist. Acetaminophen can cause liver damage if not taken correctly. Do not use more than 4 grams (4,000 milligrams) total of acetaminophen in one day.       •NSAIDs, such as ibuprofen, help decrease swelling, pain, and fever. This medicine is available with or without a doctor's order. NSAIDs can cause stomach bleeding or kidney problems in certain people. If you take blood thinner medicine, always ask your healthcare provider if NSAIDs are safe for you. Always read the medicine label and follow directions.      •Prescription pain medicine may be given. Ask your healthcare provider how to take this medicine safely. Some prescription pain medicines contain acetaminophen. Do not take other medicines that contain acetaminophen without talking to your healthcare provider. Too much acetaminophen may cause liver damage. Prescription pain medicine may cause constipation. Ask your healthcare provider how to prevent or treat constipation.       •Take your medicine as directed. Contact your healthcare provider if you think your medicine is not helping or if you have side effects. Tell him or her if you are allergic to any medicine. Keep a list of the medicines, vitamins, and herbs you take. Include the amounts, and when and why you take them. Bring the list or the pill bottles to follow-up visits. Carry your medicine list with you in case of an emergency.      Manage ovarian cysts: You can manage a current cyst and help healthcare providers find future cysts early.  •Apply heat to decrease pain and cramping from a cyst. Sit in a warm bath, or place a heating pad (turned on low) on your abdomen. Do this for 15 to 20 minutes every hour for comfort.      •Get regular pelvic exams or Pap smears. This will help providers find any new ovarian cysts. Tell your healthcare provider about any unusual changes in your monthly cycle.      Follow up with your doctor or gynecologist as directed: Write down your questions so you remember to ask them during your visits. 1) Follow up with your ob/gyn in 1-2 days  2) Return to the ER for worsening or concerning symptoms    Urinary Tract Infection in Women    WHAT YOU NEED TO KNOW:    A urinary tract infection (UTI) is caused by bacteria that get inside your urinary tract. Most bacteria that enter your urinary tract come out when you urinate. If the bacteria stay in your urinary tract, you may get an infection. Your urinary tract includes your kidneys, ureters, bladder, and urethra. Urine is made in your kidneys, and it flows from the ureters to the bladder. Urine leaves the bladder through the urethra. A UTI is more common in your lower urinary tract, which includes your bladder and urethra.     Female Urinary System         DISCHARGE INSTRUCTIONS:    Return to the emergency department if:   •You are urinating very little or not at all.      •You have a high fever with shaking chills.       •You have side or back pain that gets worse.      Call your doctor if:   •You have a fever.      •You do not feel better after 2 days of taking antibiotics.      •You are vomiting.       •You have questions or concerns about your condition or care.      Medicines:   •Antibiotics help fight a bacterial infection. If you have UTIs often (called recurrent UTIs), you may be given antibiotics to take regularly. You will be given directions for when and how to use antibiotics. The goal is to prevent UTIs but not cause antibiotic resistance by using antibiotics too often.      •Medicines may be given to decrease pain and burning when you urinate. They will also help decrease the feeling that you need to urinate often. These medicines will make your urine orange or red.      •Take your medicine as directed. Contact your healthcare provider if you think your medicine is not helping or if you have side effects. Tell him or her if you are allergic to any medicine. Keep a list of the medicines, vitamins, and herbs you take. Include the amounts, and when and why you take them. Bring the list or the pill bottles to follow-up visits. Carry your medicine list with you in case of an emergency.      Follow up with your healthcare provider as directed: Write down your questions so you remember to ask them during your visits.     Prevent another UTI:   •Empty your bladder often. Urinate and empty your bladder as soon as you feel the need. Do not hold your urine for long periods of time.      •Wipe from front to back after you urinate or have a bowel movement. This will help prevent germs from getting into your urinary tract through your urethra.      •Drink liquids as directed. Ask how much liquid to drink each day and which liquids are best for you. You may need to drink more liquids than usual to help flush out the bacteria. Do not drink alcohol, caffeine, or citrus juices. These can irritate your bladder and increase your symptoms. Your healthcare provider may recommend cranberry juice to help prevent a UTI.      •Urinate after you have sex. This can help flush out bacteria passed during sex.      •Do not douche or use feminine deodorants. These can change the chemical balance in your vagina.      •Change sanitary pads or tampons often. This will help prevent germs from getting into your urinary tract.       •Talk to your healthcare provider about your birth control method. You may need to change your method if it is increasing your risk for UTIs.      •Wear cotton underwear and clothes that are loose. Tight pants and nylon underwear can trap moisture and cause bacteria to grow.      •Vaginal estrogen may be recommended. This medicine helps prevent UTIs in women who have gone through menopause or are in tico-menopause.      •Do pelvic muscle exercises often. Pelvic muscle exercises may help you start and stop urinating. Strong pelvic muscles may help you empty your bladder easier. Squeeze these muscles tightly for 5 seconds like you are trying to hold back urine. Then relax for 5 seconds. Gradually work up to squeezing for 10 seconds. Do 3 sets of 15 repetitions a day, or as directed.      Ovarian Cyst    WHAT YOU NEED TO KNOW:    An ovarian cyst is a fluid-filled sac that grows in or on an ovary. You have 2 ovaries, 1 on each side of your uterus. They are small, about the shape of an almond. Ovarian cysts are common in women who have regular monthly cycles. During your monthly cycle, eggs are released from the ovaries. The cyst usually contains fluid but may sometimes have blood or tissue in it. Most ovarian cysts are harmless and go away without treatment in a few months. Some cysts can grow large, cause pain, or break open.     Female Reproductive System         DISCHARGE INSTRUCTIONS:    Call your local emergency number (011 in the [place]) if:   •You have severe pain with fever and vomiting.      •You have sudden, severe abdominal pain.      •You are too weak, faint, or dizzy to stand up.      •You are breathing very quickly.      Call your doctor or gynecologist if:   •Your periods are early, late, or more painful than usual.      •You have questions or concerns about your condition or care.      Medicines: You may need any of the following:   •Birth control pills may help control your monthly cycle, prevent cysts, or cause them to shrink.      •Acetaminophen decreases pain and fever. It is available without a doctor's order. Ask how much to take and how often to take it. Follow directions. Read the labels of all other medicines you are using to see if they also contain acetaminophen, or ask your doctor or pharmacist. Acetaminophen can cause liver damage if not taken correctly. Do not use more than 4 grams (4,000 milligrams) total of acetaminophen in one day.       •NSAIDs, such as ibuprofen, help decrease swelling, pain, and fever. This medicine is available with or without a doctor's order. NSAIDs can cause stomach bleeding or kidney problems in certain people. If you take blood thinner medicine, always ask your healthcare provider if NSAIDs are safe for you. Always read the medicine label and follow directions.      •Prescription pain medicine may be given. Ask your healthcare provider how to take this medicine safely. Some prescription pain medicines contain acetaminophen. Do not take other medicines that contain acetaminophen without talking to your healthcare provider. Too much acetaminophen may cause liver damage. Prescription pain medicine may cause constipation. Ask your healthcare provider how to prevent or treat constipation.       •Take your medicine as directed. Contact your healthcare provider if you think your medicine is not helping or if you have side effects. Tell him or her if you are allergic to any medicine. Keep a list of the medicines, vitamins, and herbs you take. Include the amounts, and when and why you take them. Bring the list or the pill bottles to follow-up visits. Carry your medicine list with you in case of an emergency.      Manage ovarian cysts: You can manage a current cyst and help healthcare providers find future cysts early.  •Apply heat to decrease pain and cramping from a cyst. Sit in a warm bath, or place a heating pad (turned on low) on your abdomen. Do this for 15 to 20 minutes every hour for comfort.      •Get regular pelvic exams or Pap smears. This will help providers find any new ovarian cysts. Tell your healthcare provider about any unusual changes in your monthly cycle.      Follow up with your doctor or gynecologist as directed: Write down your questions so you remember to ask them during your visits.

## 2021-07-20 VITALS
RESPIRATION RATE: 20 BRPM | TEMPERATURE: 99 F | DIASTOLIC BLOOD PRESSURE: 64 MMHG | SYSTOLIC BLOOD PRESSURE: 105 MMHG | HEART RATE: 65 BPM | OXYGEN SATURATION: 98 %

## 2021-07-20 LAB
APPEARANCE UR: CLEAR — SIGNIFICANT CHANGE UP
BACTERIA # UR AUTO: ABNORMAL
BILIRUB UR-MCNC: NEGATIVE — SIGNIFICANT CHANGE UP
C TRACH RRNA SPEC QL NAA+PROBE: SIGNIFICANT CHANGE UP
COLOR SPEC: YELLOW — SIGNIFICANT CHANGE UP
DIFF PNL FLD: ABNORMAL
EPI CELLS # UR: SIGNIFICANT CHANGE UP
GLUCOSE UR QL: NEGATIVE — SIGNIFICANT CHANGE UP
KETONES UR-MCNC: ABNORMAL
LEUKOCYTE ESTERASE UR-ACNC: ABNORMAL
N GONORRHOEA RRNA SPEC QL NAA+PROBE: SIGNIFICANT CHANGE UP
NITRITE UR-MCNC: POSITIVE
PH UR: 7 — SIGNIFICANT CHANGE UP (ref 5–8)
PROT UR-MCNC: 30 MG/DL
RBC CASTS # UR COMP ASSIST: ABNORMAL /HPF (ref 0–4)
SP GR SPEC: 1.01 — SIGNIFICANT CHANGE UP (ref 1.01–1.02)
UROBILINOGEN FLD QL: NEGATIVE — SIGNIFICANT CHANGE UP
WBC UR QL: SIGNIFICANT CHANGE UP

## 2021-07-20 PROCEDURE — 84702 CHORIONIC GONADOTROPIN TEST: CPT

## 2021-07-20 PROCEDURE — 83690 ASSAY OF LIPASE: CPT

## 2021-07-20 PROCEDURE — 76856 US EXAM PELVIC COMPLETE: CPT | Mod: 26

## 2021-07-20 PROCEDURE — 36415 COLL VENOUS BLD VENIPUNCTURE: CPT

## 2021-07-20 PROCEDURE — 76856 US EXAM PELVIC COMPLETE: CPT

## 2021-07-20 PROCEDURE — 87491 CHLMYD TRACH DNA AMP PROBE: CPT

## 2021-07-20 PROCEDURE — 96374 THER/PROPH/DIAG INJ IV PUSH: CPT

## 2021-07-20 PROCEDURE — 87591 N.GONORRHOEAE DNA AMP PROB: CPT

## 2021-07-20 PROCEDURE — 99284 EMERGENCY DEPT VISIT MOD MDM: CPT | Mod: 25

## 2021-07-20 PROCEDURE — 85025 COMPLETE CBC W/AUTO DIFF WBC: CPT

## 2021-07-20 PROCEDURE — 81001 URINALYSIS AUTO W/SCOPE: CPT

## 2021-07-20 PROCEDURE — 96375 TX/PRO/DX INJ NEW DRUG ADDON: CPT

## 2021-07-20 PROCEDURE — 76830 TRANSVAGINAL US NON-OB: CPT | Mod: 26

## 2021-07-20 PROCEDURE — 80053 COMPREHEN METABOLIC PANEL: CPT

## 2021-07-20 PROCEDURE — 76830 TRANSVAGINAL US NON-OB: CPT

## 2021-07-20 RX ORDER — METHENAMINE MANDELATE 1 G
1 TABLET ORAL
Qty: 20 | Refills: 0
Start: 2021-07-20 | End: 2021-07-29

## 2021-07-20 RX ORDER — NITROFURANTOIN MACROCRYSTAL 50 MG
100 CAPSULE ORAL ONCE
Refills: 0 | Status: COMPLETED | OUTPATIENT
Start: 2021-07-20 | End: 2021-07-20

## 2021-07-20 RX ORDER — IBUPROFEN 200 MG
1 TABLET ORAL
Qty: 20 | Refills: 0
Start: 2021-07-20 | End: 2021-07-24

## 2021-07-20 RX ORDER — KETOROLAC TROMETHAMINE 30 MG/ML
15 SYRINGE (ML) INJECTION ONCE
Refills: 0 | Status: DISCONTINUED | OUTPATIENT
Start: 2021-07-20 | End: 2021-07-20

## 2021-07-20 RX ORDER — PHENAZOPYRIDINE HCL 100 MG
1 TABLET ORAL
Qty: 6 | Refills: 0
Start: 2021-07-20 | End: 2021-07-21

## 2021-07-20 RX ORDER — PHENAZOPYRIDINE HCL 100 MG
200 TABLET ORAL ONCE
Refills: 0 | Status: COMPLETED | OUTPATIENT
Start: 2021-07-20 | End: 2021-07-20

## 2021-07-20 RX ORDER — IBUPROFEN 200 MG
600 TABLET ORAL ONCE
Refills: 0 | Status: DISCONTINUED | OUTPATIENT
Start: 2021-07-20 | End: 2021-07-24

## 2021-07-20 RX ORDER — NITROFURANTOIN MACROCRYSTAL 50 MG
1 CAPSULE ORAL
Qty: 20 | Refills: 0
Start: 2021-07-20 | End: 2021-07-29

## 2021-07-20 RX ADMIN — Medication 100 MILLIGRAM(S): at 05:30

## 2021-07-20 RX ADMIN — Medication 200 MILLIGRAM(S): at 05:30

## 2021-07-20 RX ADMIN — Medication 15 MILLIGRAM(S): at 00:29

## 2021-07-20 NOTE — ED ADULT NURSE REASSESSMENT NOTE - NS ED NURSE REASSESS COMMENT FT1
Pt received at 0300.  Documentation as noted from previous RN.  Medications ordered at 0100 given and tolerated well.  No complaints at this time. Will continue to monitor.

## 2021-07-20 NOTE — ED ADULT NURSE NOTE - OBJECTIVE STATEMENT
LLQ pain x 1 day, hx ovarian cyst. abd cramping. pt denies radiation of pain/n/v/fever/urinary symptoms.

## 2021-07-29 ENCOUNTER — EMERGENCY (EMERGENCY)
Facility: HOSPITAL | Age: 26
LOS: 1 days | Discharge: HOME | End: 2021-07-29
Attending: EMERGENCY MEDICINE
Payer: MEDICAID

## 2021-07-29 VITALS
RESPIRATION RATE: 16 BRPM | SYSTOLIC BLOOD PRESSURE: 117 MMHG | DIASTOLIC BLOOD PRESSURE: 75 MMHG | HEART RATE: 60 BPM | TEMPERATURE: 98 F | OXYGEN SATURATION: 100 %

## 2021-07-29 VITALS
HEIGHT: 67 IN | WEIGHT: 156.09 LBS | TEMPERATURE: 98 F | OXYGEN SATURATION: 99 % | SYSTOLIC BLOOD PRESSURE: 114 MMHG | DIASTOLIC BLOOD PRESSURE: 73 MMHG | RESPIRATION RATE: 18 BRPM | HEART RATE: 98 BPM

## 2021-07-29 PROBLEM — N83.209 UNSPECIFIED OVARIAN CYST, UNSPECIFIED SIDE: Chronic | Status: ACTIVE | Noted: 2021-07-20

## 2021-07-29 LAB
ALBUMIN SERPL ELPH-MCNC: 4.1 G/DL — SIGNIFICANT CHANGE UP (ref 3.3–5.2)
ALP SERPL-CCNC: 69 U/L — SIGNIFICANT CHANGE UP (ref 40–120)
ALT FLD-CCNC: 8 U/L — SIGNIFICANT CHANGE UP
ANION GAP SERPL CALC-SCNC: 16 MMOL/L — SIGNIFICANT CHANGE UP (ref 5–17)
APPEARANCE UR: ABNORMAL
AST SERPL-CCNC: 18 U/L — SIGNIFICANT CHANGE UP
BACTERIA # UR AUTO: ABNORMAL
BASOPHILS # BLD AUTO: 0.04 K/UL — SIGNIFICANT CHANGE UP (ref 0–0.2)
BASOPHILS NFR BLD AUTO: 0.7 % — SIGNIFICANT CHANGE UP (ref 0–2)
BILIRUB SERPL-MCNC: 0.7 MG/DL — SIGNIFICANT CHANGE UP (ref 0.4–2)
BILIRUB UR-MCNC: NEGATIVE — SIGNIFICANT CHANGE UP
BUN SERPL-MCNC: 7.7 MG/DL — LOW (ref 8–20)
CALCIUM SERPL-MCNC: 9.5 MG/DL — SIGNIFICANT CHANGE UP (ref 8.6–10.2)
CHLORIDE SERPL-SCNC: 103 MMOL/L — SIGNIFICANT CHANGE UP (ref 98–107)
CO2 SERPL-SCNC: 21 MMOL/L — LOW (ref 22–29)
COLOR SPEC: YELLOW — SIGNIFICANT CHANGE UP
CREAT SERPL-MCNC: 0.8 MG/DL — SIGNIFICANT CHANGE UP (ref 0.5–1.3)
DIFF PNL FLD: ABNORMAL
EOSINOPHIL # BLD AUTO: 0.18 K/UL — SIGNIFICANT CHANGE UP (ref 0–0.5)
EOSINOPHIL NFR BLD AUTO: 3 % — SIGNIFICANT CHANGE UP (ref 0–6)
EPI CELLS # UR: SIGNIFICANT CHANGE UP
GLUCOSE SERPL-MCNC: 85 MG/DL — SIGNIFICANT CHANGE UP (ref 70–99)
GLUCOSE UR QL: NEGATIVE — SIGNIFICANT CHANGE UP
HCG UR QL: NEGATIVE — SIGNIFICANT CHANGE UP
HCT VFR BLD CALC: 37.9 % — SIGNIFICANT CHANGE UP (ref 34.5–45)
HGB BLD-MCNC: 13 G/DL — SIGNIFICANT CHANGE UP (ref 11.5–15.5)
IMM GRANULOCYTES NFR BLD AUTO: 0.2 % — SIGNIFICANT CHANGE UP (ref 0–1.5)
KETONES UR-MCNC: NEGATIVE — SIGNIFICANT CHANGE UP
LEUKOCYTE ESTERASE UR-ACNC: ABNORMAL
LYMPHOCYTES # BLD AUTO: 1.75 K/UL — SIGNIFICANT CHANGE UP (ref 1–3.3)
LYMPHOCYTES # BLD AUTO: 29.5 % — SIGNIFICANT CHANGE UP (ref 13–44)
MCHC RBC-ENTMCNC: 33.9 PG — SIGNIFICANT CHANGE UP (ref 27–34)
MCHC RBC-ENTMCNC: 34.3 GM/DL — SIGNIFICANT CHANGE UP (ref 32–36)
MCV RBC AUTO: 99 FL — SIGNIFICANT CHANGE UP (ref 80–100)
MONOCYTES # BLD AUTO: 0.45 K/UL — SIGNIFICANT CHANGE UP (ref 0–0.9)
MONOCYTES NFR BLD AUTO: 7.6 % — SIGNIFICANT CHANGE UP (ref 2–14)
NEUTROPHILS # BLD AUTO: 3.51 K/UL — SIGNIFICANT CHANGE UP (ref 1.8–7.4)
NEUTROPHILS NFR BLD AUTO: 59 % — SIGNIFICANT CHANGE UP (ref 43–77)
NITRITE UR-MCNC: POSITIVE
PH UR: 7 — SIGNIFICANT CHANGE UP (ref 5–8)
PLATELET # BLD AUTO: 166 K/UL — SIGNIFICANT CHANGE UP (ref 150–400)
POTASSIUM SERPL-MCNC: 3.4 MMOL/L — LOW (ref 3.5–5.3)
POTASSIUM SERPL-SCNC: 3.4 MMOL/L — LOW (ref 3.5–5.3)
PROT SERPL-MCNC: 6.8 G/DL — SIGNIFICANT CHANGE UP (ref 6.6–8.7)
PROT UR-MCNC: 15
RBC # BLD: 3.83 M/UL — SIGNIFICANT CHANGE UP (ref 3.8–5.2)
RBC # FLD: 11.3 % — SIGNIFICANT CHANGE UP (ref 10.3–14.5)
RBC CASTS # UR COMP ASSIST: SIGNIFICANT CHANGE UP /HPF (ref 0–4)
SODIUM SERPL-SCNC: 139 MMOL/L — SIGNIFICANT CHANGE UP (ref 135–145)
SP GR SPEC: 1.01 — SIGNIFICANT CHANGE UP (ref 1.01–1.02)
UROBILINOGEN FLD QL: NEGATIVE — SIGNIFICANT CHANGE UP
WBC # BLD: 5.94 K/UL — SIGNIFICANT CHANGE UP (ref 3.8–10.5)
WBC # FLD AUTO: 5.94 K/UL — SIGNIFICANT CHANGE UP (ref 3.8–10.5)
WBC UR QL: ABNORMAL

## 2021-07-29 PROCEDURE — 87186 SC STD MICRODIL/AGAR DIL: CPT

## 2021-07-29 PROCEDURE — 81025 URINE PREGNANCY TEST: CPT

## 2021-07-29 PROCEDURE — 80053 COMPREHEN METABOLIC PANEL: CPT

## 2021-07-29 PROCEDURE — 87077 CULTURE AEROBIC IDENTIFY: CPT

## 2021-07-29 PROCEDURE — 36415 COLL VENOUS BLD VENIPUNCTURE: CPT

## 2021-07-29 PROCEDURE — 96375 TX/PRO/DX INJ NEW DRUG ADDON: CPT

## 2021-07-29 PROCEDURE — G1004: CPT

## 2021-07-29 PROCEDURE — 74176 CT ABD & PELVIS W/O CONTRAST: CPT | Mod: ME

## 2021-07-29 PROCEDURE — 96376 TX/PRO/DX INJ SAME DRUG ADON: CPT

## 2021-07-29 PROCEDURE — 96365 THER/PROPH/DIAG IV INF INIT: CPT

## 2021-07-29 PROCEDURE — 99285 EMERGENCY DEPT VISIT HI MDM: CPT

## 2021-07-29 PROCEDURE — 99284 EMERGENCY DEPT VISIT MOD MDM: CPT | Mod: 25

## 2021-07-29 PROCEDURE — 87086 URINE CULTURE/COLONY COUNT: CPT

## 2021-07-29 PROCEDURE — 96366 THER/PROPH/DIAG IV INF ADDON: CPT

## 2021-07-29 PROCEDURE — 74176 CT ABD & PELVIS W/O CONTRAST: CPT | Mod: 26,ME

## 2021-07-29 PROCEDURE — 85025 COMPLETE CBC W/AUTO DIFF WBC: CPT

## 2021-07-29 PROCEDURE — 81001 URINALYSIS AUTO W/SCOPE: CPT

## 2021-07-29 RX ORDER — CEFTRIAXONE 500 MG/1
1000 INJECTION, POWDER, FOR SOLUTION INTRAMUSCULAR; INTRAVENOUS ONCE
Refills: 0 | Status: COMPLETED | OUTPATIENT
Start: 2021-07-29 | End: 2021-07-29

## 2021-07-29 RX ORDER — SODIUM CHLORIDE 9 MG/ML
1000 INJECTION INTRAMUSCULAR; INTRAVENOUS; SUBCUTANEOUS ONCE
Refills: 0 | Status: COMPLETED | OUTPATIENT
Start: 2021-07-29 | End: 2021-07-29

## 2021-07-29 RX ORDER — ONDANSETRON 8 MG/1
1 TABLET, FILM COATED ORAL
Qty: 21 | Refills: 0
Start: 2021-07-29 | End: 2021-08-04

## 2021-07-29 RX ORDER — MORPHINE SULFATE 50 MG/1
4 CAPSULE, EXTENDED RELEASE ORAL ONCE
Refills: 0 | Status: DISCONTINUED | OUTPATIENT
Start: 2021-07-29 | End: 2021-07-29

## 2021-07-29 RX ORDER — ONDANSETRON 8 MG/1
4 TABLET, FILM COATED ORAL ONCE
Refills: 0 | Status: COMPLETED | OUTPATIENT
Start: 2021-07-29 | End: 2021-07-29

## 2021-07-29 RX ORDER — KETOROLAC TROMETHAMINE 30 MG/ML
15 SYRINGE (ML) INJECTION ONCE
Refills: 0 | Status: DISCONTINUED | OUTPATIENT
Start: 2021-07-29 | End: 2021-07-29

## 2021-07-29 RX ORDER — CEPHALEXIN 500 MG
1 CAPSULE ORAL
Qty: 14 | Refills: 0
Start: 2021-07-29 | End: 2021-08-04

## 2021-07-29 RX ORDER — POTASSIUM CHLORIDE 20 MEQ
40 PACKET (EA) ORAL ONCE
Refills: 0 | Status: COMPLETED | OUTPATIENT
Start: 2021-07-29 | End: 2021-07-29

## 2021-07-29 RX ADMIN — SODIUM CHLORIDE 1000 MILLILITER(S): 9 INJECTION INTRAMUSCULAR; INTRAVENOUS; SUBCUTANEOUS at 08:13

## 2021-07-29 RX ADMIN — ONDANSETRON 4 MILLIGRAM(S): 8 TABLET, FILM COATED ORAL at 08:13

## 2021-07-29 RX ADMIN — MORPHINE SULFATE 4 MILLIGRAM(S): 50 CAPSULE, EXTENDED RELEASE ORAL at 08:13

## 2021-07-29 RX ADMIN — Medication 15 MILLIGRAM(S): at 09:09

## 2021-07-29 RX ADMIN — CEFTRIAXONE 1000 MILLIGRAM(S): 500 INJECTION, POWDER, FOR SOLUTION INTRAMUSCULAR; INTRAVENOUS at 10:49

## 2021-07-29 RX ADMIN — CEFTRIAXONE 100 MILLIGRAM(S): 500 INJECTION, POWDER, FOR SOLUTION INTRAMUSCULAR; INTRAVENOUS at 09:19

## 2021-07-29 RX ADMIN — Medication 15 MILLIGRAM(S): at 08:13

## 2021-07-29 RX ADMIN — MORPHINE SULFATE 4 MILLIGRAM(S): 50 CAPSULE, EXTENDED RELEASE ORAL at 11:52

## 2021-07-29 RX ADMIN — MORPHINE SULFATE 4 MILLIGRAM(S): 50 CAPSULE, EXTENDED RELEASE ORAL at 09:09

## 2021-07-29 RX ADMIN — SODIUM CHLORIDE 1000 MILLILITER(S): 9 INJECTION INTRAMUSCULAR; INTRAVENOUS; SUBCUTANEOUS at 09:09

## 2021-07-29 RX ADMIN — MORPHINE SULFATE 4 MILLIGRAM(S): 50 CAPSULE, EXTENDED RELEASE ORAL at 12:15

## 2021-07-29 RX ADMIN — Medication 40 MILLIEQUIVALENT(S): at 09:08

## 2021-07-29 NOTE — ED STATDOCS - CARE PLAN
Principal Discharge DX:	UTI (urinary tract infection)  Secondary Diagnosis:	Flank pain  Secondary Diagnosis:	Pulmonary nodule

## 2021-07-29 NOTE — ED STATDOCS - CARE PROVIDER_API CALL
Tomas Almaraz)  Thoracic Surgery  [address] [place] [address]  Phone: [phone]  Fax: [phone]  Follow Up Time:

## 2021-07-29 NOTE — ED ADULT NURSE NOTE - OBJECTIVE STATEMENT
Pt diagnosed with UTI 1 week ago and states has not picked up her medications and today woke with R flank pains, nausea, vomiting and burning upon urination. Pt reports radiation of pain around to RLQ abdomen. Pt afebrile.

## 2021-07-29 NOTE — ED ADULT NURSE NOTE - CHIEF COMPLAINT QUOTE
R flank pain starting yesterday with n/v. pt states she was here one week ago diagnosed with UTI and ovarian cyst and has not taken the antibiotics prescribed. pt tearful

## 2021-07-29 NOTE — ED STATDOCS - NS_EDPROVIDERDISPOUSERTYPE_ED_A_ED
Problem: Mobility Impaired (Adult and Pediatric)  Goal: *Acute Goals and Plan of Care (Insert Text)  Physical Therapy Goals  Initiated 7/14/2017  1. Patient will move from supine to sit and sit to supine in bed with supervision/set-up within 7 day(s). 2. Patient will transfer from bed to chair and chair to bed with minimal assistance/contact guard assist using the least restrictive device within 7 day(s). 3. Patient will perform sit to stand with contact guard assist within 7 day(s). 4. Patient will ambulate with minimal assistance/contact guard assist for 50 feet with the least restrictive device within 7 day(s). 5. Patient will ascend/descend 1 stairs with bilateral handrail(s) with minimal assistance/contact guard assist within 7 day(s). PHYSICAL THERAPY REEVALUATION  Patient: Herbert Gilbert (36 y.o. female)  Date: 7/19/2017  Primary Diagnosis: right ankle fracture  Respiratory failure (HCC)  GI Bleed  Procedure(s) (LRB):  ESOPHAGOGASTRODUODENOSCOPY (EGD) (N/A) 1 Day Post-Op   Precautions:   NWB      ASSESSMENT :  Based on the objective data described below, the patient presents with decreased strength, decreased functional mobility, increased pain, and impaired balance. Patient was transferred to CCU after change in respiratory status. Patient reports 5/10 pain and RN present to give meds. Patient received on 2L O2. Patient required min A for bed mobility for RLE management with external fixator. Patient with good sitting balance. Patient able to stand with min A of 1, and mod A of another with RW. Patient able to pivot on L heel over to the chair with mod A x 1 and RW. Patient left sitting in the chair with the RLE elevated and VSS on 2L O2 at the conclusion of PT re-evaluation. Patient is very motivated and was active prior to ankle fracture, working full time and enjoying going to the pool. PT recommending IP rehab for discharge.        Patient will benefit from skilled intervention to address the above impairments. Patients rehabilitation potential is considered to be Good  Factors which may influence rehabilitation potential include:   [ ]           None noted  [ ]           Mental ability/status  [X]           Medical condition  [ ]           Home/family situation and support systems  [ ]           Safety awareness  [ ]           Pain tolerance/management  [ ]           Other:        PLAN :  Recommendations and Planned Interventions:  [X]             Bed Mobility Training             [ ]      Neuromuscular Re-Education  [X]             Transfer Training                   [ ]      Orthotic/Prosthetic Training  [ ]             Gait Training                         [ ]      Modalities  [X]             Therapeutic Exercises           [ ]      Edema Management/Control  [X]             Therapeutic Activities            [X]      Patient and Family Training/Education  [ ]             Other (comment):  Frequency/Duration: Patient will be followed by physical therapy daily to address goals. Discharge Recommendations: Inpatient Rehab  Further Equipment Recommendations for Discharge: TBD       SUBJECTIVE:   Patient stated This is my favorite part.       OBJECTIVE DATA SUMMARY:       Past Medical History:   Diagnosis Date    Arthritis      CAD (coronary artery disease)       renal stent; Dr Bailey Maharaj     Diabetes [facility])       oral    History of bronchitis      Hyperlipemia      Hypertension       Past Surgical History:   Procedure Laterality Date    CENTRAL VENOUS LINE INSERTION   7/17/2017          HX CATARACT REMOVAL Bilateral      HX CHOLECYSTECTOMY        HX HYSTERECTOMY        HX ORTHOPAEDIC         R hand middle finger bone spur    VASCULAR SURGERY PROCEDURE UNLIST         renal stent    VASCULAR SURGERY PROCEDURE UNLIST   3/5/14     LEFT FEMORAL-POPLITEAL BYPASS WITH PTFE [address] course since last seen and reason for reevaluation: patient transferred to CCU after change in respiratory status. Critical Behavior:  Neurologic State: Drowsy  Orientation Level: Oriented X4  Cognition: Follows commands              Functional Mobility:  Bed Mobility:  Rolling: Minimum assistance  Supine to Sit: Minimum assistance     Scooting: Contact guard assistance  Transfers:  Sit to Stand: Moderate assistance;Assist x2  Stand to Sit: Moderate assistance  Stand Pivot Transfers: Moderate assistance     Bed to Chair: Moderate assistance;Assist x2 (min of 1, mod of 1)           Balance:   Sitting: Intact; Without support  Standing: Impaired; With support  Standing - Static: Constant support; Fair  Standing - Dynamic : Poor  Ambulation/Gait Training:                                Pain:  Pain Scale 1: Numeric (0 - 10)  Pain Intensity 1: 0  Pain Location 1: Ankle  Pain Orientation 1: Right     Pain Intervention(s) 1: Medication (see MAR)  Activity Tolerance:   Fair, VSS on 2L O2  Please refer to the flowsheet for vital signs taken during this treatment. After treatment:   [X]  Patient left in no apparent distress sitting up in chair  [ ]  Patient left in no apparent distress in bed  [X]  Call bell left within reach  [X]  Nursing notified  [ ]  Caregiver present  [ ]  Bed alarm activated      COMMUNICATION/EDUCATION:   The patients plan of care was discussed with: Occupational Therapist, Registered Nurse and . [X]  Fall prevention education was provided and the patient/caregiver indicated understanding. [X]  Patient/family have participated as able in goal setting and plan of care. [X]  Patient/family agree to work toward stated goals and plan of care. [ ]  Patient understands intent and goals of therapy, but is neutral about his/her participation. [ ]  Patient is unable to participate in goal setting and plan of care.      Thank you for this referral.  Bailey Staton, PT, DPT   Time Calculation: 24 mins Scribe Attestation (For Scribes USE Only)... Attending Attestation (For Attendings USE Only).../Scribe Attestation (For Scribes USE Only)...

## 2021-07-29 NOTE — ED STATDOCS - OBJECTIVE STATEMENT
26 y/o F pt with significant PMHx of ovarian cyst presents to the ED c/o intermittent right sided flank pain radiating to her front. She describes the pain as shooting  Pt also notes burning urination, nausea and vomiting. Denies hematuria, fever and chest pain. She notes the pain is similar to her last ovarian cyst, and notes bacterial vaginosis. 24 y/o F pt with significant PMHx of ovarian cyst presents to the ED c/o intermittent/ colicky, sharp, right sided flank pain radiating to RLQ since awakening this morning.  Pt also notes burning urination, nausea and vomiting x1. Denies hematuria, urinary frequency, urinary urgency, fever and chest pain. She notes the pain is similar to her last ovarian cyst, and notes h/o bacterial vaginosis.

## 2021-07-29 NOTE — ED STATDOCS - NSFOLLOWUPINSTRUCTIONS_ED_ALL_ED_FT
- you have a urinary tract infection, take antibiotics as directed starting tonight.  Do not skip doses or stop before the full 7 day course is completed  - you may have a kidney stone on the right side, the CT scan was inconclusive, tylenol or ibuprofen for mild to moderate pain, percocet for severe pain  - zofran is for nausea, take as needed  - drink plenty of fluids  - make an appointment to see your medical doctor on Monday    Your Ct scan showed a 6mm pulmonary nodule ( a mass in your lung) that will need periodic follow up.  Call to make an appointment with Dr Almaraz

## 2021-07-29 NOTE — ED STATDOCS - PATIENT PORTAL LINK FT
You can access the FollowMyHealth Patient Portal offered by [facility] by registering at the following website: [email or web address]. By joining [facility]’s FollowMyHealth portal, you will also be able to view your health information using other applications (apps) compatible with our system.

## 2021-07-29 NOTE — ED STATDOCS - NS_ ATTENDINGSCRIBEDETAILS _ED_A_ED_FT
I, Dexter Martinez, performed the initial face to face bedside interview with this patient regarding history of present illness, review of symptoms and relevant past medical, social and family history.  I completed an independent physical examination.  I was the provider who initially evaluated this patient.  The history, relevant review of systems, past medical and surgical history, medical decision making, and physical examination was documented by the scribe in my presence and I attest to the accuracy of the documentation. Follow-up on ordered tests (ie labs, radiologic studies) and re-evaluation of the patient's status has been communicated to the ACP.  Disposition of the patient will be based on test outcome and response to ED interventions.

## 2021-07-29 NOTE — ED STATDOCS - CLINICAL SUMMARY MEDICAL DECISION MAKING FREE TEXT BOX
Possible kidney stone. Check labs, CT, and provide symptom control Right flank pain rad to RLQ with dysuria since this morning: Possible kidney stone vs pyelo. Check labs, CT, and provide symptom control

## 2021-07-29 NOTE — ED STATDOCS - PROGRESS NOTE DETAILS
NP NOTE:  Charting and results reviewed.  + UTI received ceftriaxone x 1, will give keflex x 7 days CT with possible 5mm non-obstructing right distal ureter calculus, small amount pelvic free fluid.  Tolerating po, pain controlled.  Will d/c home with pain control and zofran, f/u PCP on Monday.  Incidental finding pulmonary nodule, will refer to Ct surgery for f/u.

## 2021-07-29 NOTE — ED ADULT NURSE NOTE - NSIMPLEMENTINTERV_GEN_ALL_ED
Implemented All Universal Safety Interventions:  [place] to call system. Call bell, personal items and telephone within reach. Instruct patient to call for assistance. Room bathroom lighting operational. Non-slip footwear when patient is off stretcher. Physically safe environment: no spills, clutter or unnecessary equipment. Stretcher in lowest position, wheels locked, appropriate side rails in place.

## 2021-08-01 PROBLEM — R91.1 LUNG NODULE: Status: ACTIVE | Noted: 2021-08-01

## 2021-08-05 ENCOUNTER — APPOINTMENT (OUTPATIENT)
Dept: THORACIC SURGERY | Facility: CLINIC | Age: 26
End: 2021-08-05
Payer: MEDICAID

## 2021-08-05 VITALS
DIASTOLIC BLOOD PRESSURE: 80 MMHG | WEIGHT: 152 LBS | SYSTOLIC BLOOD PRESSURE: 100 MMHG | BODY MASS INDEX: 23.86 KG/M2 | OXYGEN SATURATION: 99 % | HEART RATE: 73 BPM | HEIGHT: 67 IN | TEMPERATURE: 99 F | RESPIRATION RATE: 18 BRPM

## 2021-08-05 DIAGNOSIS — N83.209 UNSPECIFIED OVARIAN CYST, UNSPECIFIED SIDE: ICD-10-CM

## 2021-08-05 DIAGNOSIS — Z72.0 TOBACCO USE: ICD-10-CM

## 2021-08-05 DIAGNOSIS — N39.0 URINARY TRACT INFECTION, SITE NOT SPECIFIED: ICD-10-CM

## 2021-08-05 DIAGNOSIS — Z83.3 FAMILY HISTORY OF DIABETES MELLITUS: ICD-10-CM

## 2021-08-05 DIAGNOSIS — Z78.9 OTHER SPECIFIED HEALTH STATUS: ICD-10-CM

## 2021-08-05 DIAGNOSIS — F17.210 NICOTINE DEPENDENCE, CIGARETTES, UNCOMPLICATED: ICD-10-CM

## 2021-08-05 DIAGNOSIS — R91.1 SOLITARY PULMONARY NODULE: ICD-10-CM

## 2021-08-05 PROCEDURE — 99204 OFFICE O/P NEW MOD 45 MIN: CPT

## 2021-08-05 RX ORDER — FLUCONAZOLE 150 MG/1
1 TABLET ORAL
Qty: 1 | Refills: 0
Start: 2021-08-05

## 2021-08-05 RX ORDER — ETONOGESTREL AND ETHINYL ESTRADIOL .12; .015 MG/D; MG/D
INSERT, EXTENDED RELEASE VAGINAL
Refills: 0 | Status: ACTIVE | COMMUNITY

## 2021-08-08 PROBLEM — N83.209 OVARIAN CYST: Status: ACTIVE | Noted: 2021-08-08

## 2021-08-08 PROBLEM — Z72.0 CURRENT TOBACCO USE: Status: ACTIVE | Noted: 2021-08-08

## 2021-08-08 PROBLEM — N39.0 UTI (URINARY TRACT INFECTION): Status: RESOLVED | Noted: 2021-08-08 | Resolved: 2021-09-07

## 2021-08-08 NOTE — PHYSICAL EXAM
[General Appearance - Alert] : alert [Sclera] : the sclera and conjunctiva were normal [Hearing Threshold Finger Rub Not Kay] : hearing was normal [Neck Appearance] : the appearance of the neck was normal [Respiration, Rhythm And Depth] : normal respiratory rhythm and effort [Exaggerated Use Of Accessory Muscles For Inspiration] : no accessory muscle use [Auscultation Breath Sounds / Voice Sounds] : lungs were clear to auscultation bilaterally [Heart Rate And Rhythm] : heart rate was normal and rhythm regular [Examination Of The Chest] : the chest was normal in appearance [Chest Visual Inspection Thoracic Asymmetry] : no chest asymmetry [Abnormal Walk] : normal gait [] : no rash [Skin Lesions] : no skin lesions [Sensation] : the sensory exam was normal to light touch and pinprick [Motor Exam] : the motor exam was normal [Oriented To Time, Place, And Person] : oriented to person, place, and time [FreeTextEntry1] : Positive CVA tenderness right side

## 2021-08-08 NOTE — CONSULT LETTER
[Dear  ___] : Dear  [unfilled], [Courtesy Letter:] : I had the pleasure of seeing your patient, [unfilled], in my office today. [Please see my note below.] : Please see my note below. [Consult Closing:] : Thank you very much for allowing me to participate in the care of this patient.  If you have any questions, please do not hesitate to contact me. [Sincerely,] : Sincerely, [FreeTextEntry3] :  Tomas Almaraz MD\par Director of Thoracic, [facility]\par Cardiovascular & Thoracic Surgery\par [address] [place] [address] \[place] [address]\par Tel: [phone]\par Fax: [phone]\par

## 2021-08-08 NOTE — HISTORY OF PRESENT ILLNESS
[FreeTextEntry1] : Ms. HANDY is a 25 year old  active smoker (1.5 PPD x 9 years) who was referred from [facility] for a abnormal 7.29.21 CT Renal Stone imaging,  this incidentally displayed a 6 mm right lower lobe nodule with scattered peripheral punctate pulmonary nodules.She originally presented with a chief complaint of  right sided flank pain associated  with nausea and vomiting. One week prior she was  diagnosed with UTI and ovarian cyst. Today she is on her last dose of oral antibiotics, however she continues to have residual right sided flank pain. Past medical history is significant  for a UTI, ovarian cyst, lung nodules, and orthopedic related issues. She denies  denies fever, chills, dizziness, shortness of breath with climbing up/down stairs or with exertion, unintentional weight loss or coughing.  She is here to discuss interventional management.

## 2021-08-08 NOTE — ASSESSMENT
[FreeTextEntry1] : I had the pleasure of evaluating  .  Today I have  discussed  her 7.29.21 CT Renal Stone imaging,  this incidentally displayed a 6 mm right lower lobe nodule with scattered peripheral punctate pulmonary nodules.  Today she is on her last dose of oral antibiotics for a UTI, however she continues to have residual right sided flank pain.  She denies  denies fever, chills, dizziness, shortness of breath with climbing up/down stairs or with exertion, unintentional weight loss or coughing. \par \par  After reviewing the images we have discussed the characteristics of malignant nodules. Characteristic of malignant nodules includes size greater than 1 cm, irregular borders, density and rate of growth. I have reviewed the patient's medical records and diagnostic images during the time of this office visit, and I have made the following recommendation to obtain a dedicated  Non- Contrast CT Chest Scan to further evaluate her lung nodule. Expectations reviewed with patient. All questions addressed. Patient agrees and will adhere to plan. \par \par PLAN: \par  - Return to care with a dedicated Non- Contrast CT Chest Scan \par \par Shira MACARIO NP am scribing for and in the presence of Dr. Almaraz the following sections HISTORY OF PRESENT ILLNESS, PAST MEDICAL/FAMILY/SOCIAL HISTORY; REVIEW OF SYSTEMS; VITAL SIGNS; PHYSICAL EXAM; DISPOSITION.\par \par "I personally performed the services described in the documentation, reviewed the documentation recorded by the scribe in my presence and accurately and completely records my words and actions."\par

## 2021-08-08 NOTE — DATA REVIEWED
[FreeTextEntry1] : 7.29.21  CT RENAL STONE HUNT from [facility] \par FINDINGS:\par LOWER CHEST: 6 mm right lower lobe nodule with scattered peripheral punctate pulmonary nodules (series 3 image 14).\par \par IMPRESSION: \par -No hydronephrosis or intrarenal calculus bilaterally.\par -5 mm calcification in the right hemipelvis with evaluation limited secondary to a paucity of periureteral fat; a nonobstructing distal right ureteral calculus cannot be excluded.\par -6 mm right lower lobe pulmonary nodule, likely postinflammatory or an intrapulmonary lymph node.\par

## 2021-08-30 NOTE — ED ADULT TRIAGE NOTE - NS ED NURSE BANDS TYPE
From: Siddhartha Houston  To: Dhaval Velazquez  Sent: 8/30/2021 4:25 PM CDT  Subject: Non-Urgent Medical Question    The bp sheet you wanted   Name band;

## 2021-11-24 ENCOUNTER — EMERGENCY (EMERGENCY)
Facility: HOSPITAL | Age: 26
LOS: 1 days | Discharge: HOME | End: 2021-11-24
Attending: EMERGENCY MEDICINE
Payer: MEDICAID

## 2021-11-24 VITALS
SYSTOLIC BLOOD PRESSURE: 122 MMHG | RESPIRATION RATE: 18 BRPM | TEMPERATURE: 99 F | HEART RATE: 89 BPM | DIASTOLIC BLOOD PRESSURE: 78 MMHG | OXYGEN SATURATION: 98 %

## 2021-11-24 VITALS
RESPIRATION RATE: 20 BRPM | HEIGHT: 67 IN | TEMPERATURE: 98 F | SYSTOLIC BLOOD PRESSURE: 126 MMHG | OXYGEN SATURATION: 98 % | DIASTOLIC BLOOD PRESSURE: 80 MMHG | HEART RATE: 116 BPM | WEIGHT: 130.07 LBS

## 2021-11-24 LAB
ALBUMIN SERPL ELPH-MCNC: 4.4 G/DL — SIGNIFICANT CHANGE UP (ref 3.3–5.2)
ALP SERPL-CCNC: 66 U/L — SIGNIFICANT CHANGE UP (ref 40–120)
ALT FLD-CCNC: 11 U/L — SIGNIFICANT CHANGE UP
ANION GAP SERPL CALC-SCNC: 12 MMOL/L — SIGNIFICANT CHANGE UP (ref 5–17)
APPEARANCE UR: ABNORMAL
AST SERPL-CCNC: 16 U/L — SIGNIFICANT CHANGE UP
BACTERIA # UR AUTO: ABNORMAL
BASOPHILS # BLD AUTO: 0.03 K/UL — SIGNIFICANT CHANGE UP (ref 0–0.2)
BASOPHILS NFR BLD AUTO: 0.5 % — SIGNIFICANT CHANGE UP (ref 0–2)
BILIRUB SERPL-MCNC: 0.6 MG/DL — SIGNIFICANT CHANGE UP (ref 0.4–2)
BILIRUB UR-MCNC: NEGATIVE — SIGNIFICANT CHANGE UP
BUN SERPL-MCNC: 8.8 MG/DL — SIGNIFICANT CHANGE UP (ref 8–20)
CALCIUM SERPL-MCNC: 8.9 MG/DL — SIGNIFICANT CHANGE UP (ref 8.6–10.2)
CHLORIDE SERPL-SCNC: 105 MMOL/L — SIGNIFICANT CHANGE UP (ref 98–107)
CO2 SERPL-SCNC: 22 MMOL/L — SIGNIFICANT CHANGE UP (ref 22–29)
COLOR SPEC: YELLOW — SIGNIFICANT CHANGE UP
CREAT SERPL-MCNC: 0.63 MG/DL — SIGNIFICANT CHANGE UP (ref 0.5–1.3)
DIFF PNL FLD: NEGATIVE — SIGNIFICANT CHANGE UP
EOSINOPHIL # BLD AUTO: 0.1 K/UL — SIGNIFICANT CHANGE UP (ref 0–0.5)
EOSINOPHIL NFR BLD AUTO: 1.5 % — SIGNIFICANT CHANGE UP (ref 0–6)
EPI CELLS # UR: ABNORMAL
GLUCOSE SERPL-MCNC: 85 MG/DL — SIGNIFICANT CHANGE UP (ref 70–99)
GLUCOSE UR QL: NEGATIVE MG/DL — SIGNIFICANT CHANGE UP
HCG SERPL-ACNC: 176.6 MIU/ML — HIGH
HCT VFR BLD CALC: 39.3 % — SIGNIFICANT CHANGE UP (ref 34.5–45)
HGB BLD-MCNC: 13.4 G/DL — SIGNIFICANT CHANGE UP (ref 11.5–15.5)
IMM GRANULOCYTES NFR BLD AUTO: 0.3 % — SIGNIFICANT CHANGE UP (ref 0–1.5)
KETONES UR-MCNC: NEGATIVE — SIGNIFICANT CHANGE UP
LEUKOCYTE ESTERASE UR-ACNC: ABNORMAL
LIDOCAIN IGE QN: 29 U/L — SIGNIFICANT CHANGE UP (ref 22–51)
LYMPHOCYTES # BLD AUTO: 2 K/UL — SIGNIFICANT CHANGE UP (ref 1–3.3)
LYMPHOCYTES # BLD AUTO: 30.4 % — SIGNIFICANT CHANGE UP (ref 13–44)
MCHC RBC-ENTMCNC: 34.1 GM/DL — SIGNIFICANT CHANGE UP (ref 32–36)
MCHC RBC-ENTMCNC: 34.7 PG — HIGH (ref 27–34)
MCV RBC AUTO: 101.8 FL — HIGH (ref 80–100)
MONOCYTES # BLD AUTO: 0.46 K/UL — SIGNIFICANT CHANGE UP (ref 0–0.9)
MONOCYTES NFR BLD AUTO: 7 % — SIGNIFICANT CHANGE UP (ref 2–14)
NEUTROPHILS # BLD AUTO: 3.96 K/UL — SIGNIFICANT CHANGE UP (ref 1.8–7.4)
NEUTROPHILS NFR BLD AUTO: 60.3 % — SIGNIFICANT CHANGE UP (ref 43–77)
NITRITE UR-MCNC: NEGATIVE — SIGNIFICANT CHANGE UP
PH UR: 6 — SIGNIFICANT CHANGE UP (ref 5–8)
PLATELET # BLD AUTO: 203 K/UL — SIGNIFICANT CHANGE UP (ref 150–400)
POTASSIUM SERPL-MCNC: 4.2 MMOL/L — SIGNIFICANT CHANGE UP (ref 3.5–5.3)
POTASSIUM SERPL-SCNC: 4.2 MMOL/L — SIGNIFICANT CHANGE UP (ref 3.5–5.3)
PROT SERPL-MCNC: 7.2 G/DL — SIGNIFICANT CHANGE UP (ref 6.6–8.7)
PROT UR-MCNC: 30 MG/DL
RBC # BLD: 3.86 M/UL — SIGNIFICANT CHANGE UP (ref 3.8–5.2)
RBC # FLD: 11.8 % — SIGNIFICANT CHANGE UP (ref 10.3–14.5)
RBC CASTS # UR COMP ASSIST: SIGNIFICANT CHANGE UP /HPF (ref 0–4)
SODIUM SERPL-SCNC: 139 MMOL/L — SIGNIFICANT CHANGE UP (ref 135–145)
SP GR SPEC: 1.01 — SIGNIFICANT CHANGE UP (ref 1.01–1.02)
UROBILINOGEN FLD QL: NEGATIVE MG/DL — SIGNIFICANT CHANGE UP
WBC # BLD: 6.57 K/UL — SIGNIFICANT CHANGE UP (ref 3.8–10.5)
WBC # FLD AUTO: 6.57 K/UL — SIGNIFICANT CHANGE UP (ref 3.8–10.5)
WBC UR QL: SIGNIFICANT CHANGE UP

## 2021-11-24 PROCEDURE — 76801 OB US < 14 WKS SINGLE FETUS: CPT | Mod: 26

## 2021-11-24 PROCEDURE — 76817 TRANSVAGINAL US OBSTETRIC: CPT

## 2021-11-24 PROCEDURE — 81001 URINALYSIS AUTO W/SCOPE: CPT

## 2021-11-24 PROCEDURE — 99285 EMERGENCY DEPT VISIT HI MDM: CPT

## 2021-11-24 PROCEDURE — 76801 OB US < 14 WKS SINGLE FETUS: CPT

## 2021-11-24 PROCEDURE — 99284 EMERGENCY DEPT VISIT MOD MDM: CPT | Mod: 25

## 2021-11-24 PROCEDURE — 80053 COMPREHEN METABOLIC PANEL: CPT

## 2021-11-24 PROCEDURE — 96375 TX/PRO/DX INJ NEW DRUG ADDON: CPT

## 2021-11-24 PROCEDURE — 96374 THER/PROPH/DIAG INJ IV PUSH: CPT

## 2021-11-24 PROCEDURE — 84702 CHORIONIC GONADOTROPIN TEST: CPT

## 2021-11-24 PROCEDURE — 87086 URINE CULTURE/COLONY COUNT: CPT

## 2021-11-24 PROCEDURE — 85025 COMPLETE CBC W/AUTO DIFF WBC: CPT

## 2021-11-24 PROCEDURE — 36415 COLL VENOUS BLD VENIPUNCTURE: CPT

## 2021-11-24 PROCEDURE — 83690 ASSAY OF LIPASE: CPT

## 2021-11-24 PROCEDURE — 76817 TRANSVAGINAL US OBSTETRIC: CPT | Mod: 26

## 2021-11-24 RX ORDER — ONDANSETRON 8 MG/1
4 TABLET, FILM COATED ORAL ONCE
Refills: 0 | Status: COMPLETED | OUTPATIENT
Start: 2021-11-24 | End: 2021-11-24

## 2021-11-24 RX ORDER — CEPHALEXIN 500 MG
1 CAPSULE ORAL
Qty: 14 | Refills: 0
Start: 2021-11-24 | End: 2021-11-30

## 2021-11-24 RX ORDER — CEPHALEXIN 500 MG
500 CAPSULE ORAL ONCE
Refills: 0 | Status: COMPLETED | OUTPATIENT
Start: 2021-11-24 | End: 2021-11-24

## 2021-11-24 RX ORDER — SODIUM CHLORIDE 9 MG/ML
1000 INJECTION INTRAMUSCULAR; INTRAVENOUS; SUBCUTANEOUS ONCE
Refills: 0 | Status: COMPLETED | OUTPATIENT
Start: 2021-11-24 | End: 2021-11-24

## 2021-11-24 RX ORDER — MORPHINE SULFATE 50 MG/1
4 CAPSULE, EXTENDED RELEASE ORAL ONCE
Refills: 0 | Status: DISCONTINUED | OUTPATIENT
Start: 2021-11-24 | End: 2021-11-24

## 2021-11-24 RX ADMIN — Medication 500 MILLIGRAM(S): at 23:16

## 2021-11-24 RX ADMIN — ONDANSETRON 4 MILLIGRAM(S): 8 TABLET, FILM COATED ORAL at 20:23

## 2021-11-24 RX ADMIN — SODIUM CHLORIDE 1000 MILLILITER(S): 9 INJECTION INTRAMUSCULAR; INTRAVENOUS; SUBCUTANEOUS at 20:23

## 2021-11-24 RX ADMIN — MORPHINE SULFATE 4 MILLIGRAM(S): 50 CAPSULE, EXTENDED RELEASE ORAL at 20:23

## 2021-11-24 NOTE — ED STATDOCS - OBJECTIVE STATEMENT
26 y/o female with PMHx of ovarian cyst c/o abdominal pain. Pt states pain feels similar to past ovarian cysts. Pt endorses nausea. Pt unsure of pregnancy status. Pt states she also has some urinary urgency. Pt not vaccinated against covid. Pt states she smoked some marijuana but that did not help

## 2021-11-24 NOTE — ED STATDOCS - ATTENDING CONTRIBUTION TO CARE
The patient seen and examined.    Abdominal Pain    I, Alfonso Christianson, performed the initial face to face bedside interview with this patient regarding history of present illness, review of symptoms and relevant past medical, social and family history.  I completed an independent physical examination.  I was the initial provider who evaluated this patient. I have signed out the follow up of any pending tests (i.e. labs, radiological studies) to the ACP.  I have communicated the patient’s plan of care and disposition with the ACP.

## 2021-11-24 NOTE — ED ADULT NURSE NOTE - OBJECTIVE STATEMENT
Assumed care of patient in int-1. Pt a&ox4 rr even and unlabored. Pt. reports to ed with pelvic pain x2 days with intermittent episodes of nausea but no vomiting. Pt. reports hx of ovarian cysts with pain when menstrual cycle. Pt. reports not peeing enough and some back pain. pt educated on plan of care, pt able to successfully teach back plan of care to RN, RN will continue to reeducate pt during hospital stay.

## 2021-11-24 NOTE — ED STATDOCS - PATIENT PORTAL LINK FT
You can access the FollowMyHealth Patient Portal offered by [facility] by registering at the following website: [email or web address]. By joining [facility]’s FollowMyHealth portal, you will also be able to view your health information using other applications (apps) compatible with our system.

## 2021-11-24 NOTE — ED STATDOCS - PROGRESS NOTE DETAILS
ELDA Jose NOTE: Reviewed all results and plan; will advise 48 hour return to GYN or ER for labs/US  Abd soft ntnd, Pt stable for d/c, reports improvement, VSS, tolerating PO, ambulatory.  Discussion includes results, plan, proper medication use/side effects, and return precautions. Pt advised to f/u with PMD 1-2 days and specialists discussed.  Printed copies of available lab/radiology results contained within discharge packet. Pt verbalized understanding/agreement of plan. ELDA Jose NOTE: Pt evaluated at bedside. 25 F A2 presents with abdominal pain and nausea, denies vaginal bleeding or discharge. Follows with GYN    PE: abd soft ntnd no rebound or guarding, no CVAT   Pt evaluated prior by intake physician. Otherwise HPI/PE/ROS as noted above. Will follow up plan per intake physician.

## 2021-11-24 NOTE — ED STATDOCS - CLINICAL SUMMARY MEDICAL DECISION MAKING FREE TEXT BOX
24 y/o female presents with abdominal pain similar to past ovarian cyst pain, will check labs, US and re-evaluate

## 2021-11-24 NOTE — ED ADULT TRIAGE NOTE - CHIEF COMPLAINT QUOTE
I have these bad lower abdominal cramps going to my back, nauseas w/o vomiting. been trying to smoke weed to help with the pains

## 2021-11-24 NOTE — ED STATDOCS - NSFOLLOWUPINSTRUCTIONS_ED_ALL_ED_FT
- Please follow up with your Primary Care Doctor in 1 - 2 days. If you cannot follow-up with your primary care doctor please return to the Emergency Department for any urgent issues.  - Seek immediate medical care for any new, worsening or concerning signs or symptoms.   - Take medications as directed, be sure to read all instructions on packaging  - You were given copies of all your test results, please bring to your primary care doctor for review of any abnormal test results  - Follow up with your OB/GYN this FRIDAY 11/26/21 for repeat labs/ultrasound, if unable to go please return to ER for testing  - Stop smoking marijuana   - If you have difficulty following up, please call: [phone] ([phone] or go to [email or web address] to obtain a [facility] doctor or specialist who takes your insurance in your area.    Feel better!       Contact a doctor if:    •You have vaginal bleeding at any time while you are pregnant.      •You have cramps.      •You have a fever.        Get help right away if:    •You have very bad cramps in your back or belly (abdomen).      •You pass large clots or a lot of tissue from your vagina.      •Your bleeding gets worse.      •You feel light-headed.      •You feel weak.      •You pass out (faint).      •You have chills.      •You are leaking fluid from your vagina.      •You have a gush of fluid from your vagina.

## 2021-11-24 NOTE — ED ADULT NURSE NOTE - CAS EDP DISCH TYPE
Patient: Suni Zuluaga    Procedure(s):  ANESTHESIA OUT OF OR RAD THERAPY  [employer]  @0830    Diagnosis: Malignant neoplasm of cervix, unspecified site (H) [C53.9]  Diagnosis Additional Information: No value filed.    Anesthesia Type:   MAC     Note:  Airway :Room Air  Patient transferred to:Medical/Surgical Unit  Comments:   Patient vital signs: stable    Spontaneous breathing. Alert and conversing.    Report to KORIN Harper  .Handoff Report: Identifed the Patient, Identified the Reponsible Provider, Reviewed the pertinent medical history, Discussed the surgical course, Reviewed Intra-OP anesthesia mangement and issues during anesthesia, Set expectations for post-procedure period and Allowed opportunity for questions and acknowledgement of understanding      Vitals: (Last set prior to Anesthesia Care Transfer)    CRNA VITALS  11/6/2020 0856 - 11/6/2020 0926      11/6/2020             Pulse:  86    SpO2:  99 %                Electronically Signed By: LAURA Dasilva CRNA  November 6, 2020  9:26 AM  
Home

## 2021-11-25 LAB
CULTURE RESULTS: SIGNIFICANT CHANGE UP
SPECIMEN SOURCE: SIGNIFICANT CHANGE UP

## 2022-02-02 ENCOUNTER — EMERGENCY (EMERGENCY)
Facility: HOSPITAL | Age: 27
LOS: 1 days | Discharge: HOME | End: 2022-02-02
Attending: EMERGENCY MEDICINE
Payer: MEDICAID

## 2022-02-02 VITALS
SYSTOLIC BLOOD PRESSURE: 107 MMHG | DIASTOLIC BLOOD PRESSURE: 74 MMHG | TEMPERATURE: 99 F | HEART RATE: 82 BPM | RESPIRATION RATE: 19 BRPM | HEIGHT: 67 IN | WEIGHT: 156.09 LBS | OXYGEN SATURATION: 99 %

## 2022-02-02 LAB
ALBUMIN SERPL ELPH-MCNC: 4.3 G/DL — SIGNIFICANT CHANGE UP (ref 3.3–5.2)
ALP SERPL-CCNC: 70 U/L — SIGNIFICANT CHANGE UP (ref 40–120)
ALT FLD-CCNC: 9 U/L — SIGNIFICANT CHANGE UP
ANION GAP SERPL CALC-SCNC: 16 MMOL/L — SIGNIFICANT CHANGE UP (ref 5–17)
APPEARANCE UR: CLEAR — SIGNIFICANT CHANGE UP
AST SERPL-CCNC: 15 U/L — SIGNIFICANT CHANGE UP
BACTERIA # UR AUTO: ABNORMAL
BASOPHILS # BLD AUTO: 0.04 K/UL — SIGNIFICANT CHANGE UP (ref 0–0.2)
BASOPHILS NFR BLD AUTO: 0.5 % — SIGNIFICANT CHANGE UP (ref 0–2)
BILIRUB SERPL-MCNC: 0.7 MG/DL — SIGNIFICANT CHANGE UP (ref 0.4–2)
BILIRUB UR-MCNC: NEGATIVE — SIGNIFICANT CHANGE UP
BUN SERPL-MCNC: 9.7 MG/DL — SIGNIFICANT CHANGE UP (ref 8–20)
CALCIUM SERPL-MCNC: 9.2 MG/DL — SIGNIFICANT CHANGE UP (ref 8.6–10.2)
CHLORIDE SERPL-SCNC: 106 MMOL/L — SIGNIFICANT CHANGE UP (ref 98–107)
CO2 SERPL-SCNC: 20 MMOL/L — LOW (ref 22–29)
COLOR SPEC: YELLOW — SIGNIFICANT CHANGE UP
CREAT SERPL-MCNC: 0.65 MG/DL — SIGNIFICANT CHANGE UP (ref 0.5–1.3)
DIFF PNL FLD: ABNORMAL
EOSINOPHIL # BLD AUTO: 0.13 K/UL — SIGNIFICANT CHANGE UP (ref 0–0.5)
EOSINOPHIL NFR BLD AUTO: 1.8 % — SIGNIFICANT CHANGE UP (ref 0–6)
EPI CELLS # UR: SIGNIFICANT CHANGE UP
GLUCOSE SERPL-MCNC: 77 MG/DL — SIGNIFICANT CHANGE UP (ref 70–99)
GLUCOSE UR QL: NEGATIVE MG/DL — SIGNIFICANT CHANGE UP
HCG SERPL-ACNC: <4 MIU/ML — SIGNIFICANT CHANGE UP
HCT VFR BLD CALC: 38.5 % — SIGNIFICANT CHANGE UP (ref 34.5–45)
HGB BLD-MCNC: 13.5 G/DL — SIGNIFICANT CHANGE UP (ref 11.5–15.5)
IMM GRANULOCYTES NFR BLD AUTO: 0.1 % — SIGNIFICANT CHANGE UP (ref 0–1.5)
KETONES UR-MCNC: ABNORMAL
LEUKOCYTE ESTERASE UR-ACNC: ABNORMAL
LIDOCAIN IGE QN: 29 U/L — SIGNIFICANT CHANGE UP (ref 22–51)
LYMPHOCYTES # BLD AUTO: 2.1 K/UL — SIGNIFICANT CHANGE UP (ref 1–3.3)
LYMPHOCYTES # BLD AUTO: 28.6 % — SIGNIFICANT CHANGE UP (ref 13–44)
MCHC RBC-ENTMCNC: 34.8 PG — HIGH (ref 27–34)
MCHC RBC-ENTMCNC: 35.1 GM/DL — SIGNIFICANT CHANGE UP (ref 32–36)
MCV RBC AUTO: 99.2 FL — SIGNIFICANT CHANGE UP (ref 80–100)
MONOCYTES # BLD AUTO: 0.52 K/UL — SIGNIFICANT CHANGE UP (ref 0–0.9)
MONOCYTES NFR BLD AUTO: 7.1 % — SIGNIFICANT CHANGE UP (ref 2–14)
NEUTROPHILS # BLD AUTO: 4.53 K/UL — SIGNIFICANT CHANGE UP (ref 1.8–7.4)
NEUTROPHILS NFR BLD AUTO: 61.9 % — SIGNIFICANT CHANGE UP (ref 43–77)
NITRITE UR-MCNC: NEGATIVE — SIGNIFICANT CHANGE UP
PH UR: 6 — SIGNIFICANT CHANGE UP (ref 5–8)
PLATELET # BLD AUTO: 185 K/UL — SIGNIFICANT CHANGE UP (ref 150–400)
POTASSIUM SERPL-MCNC: 3.9 MMOL/L — SIGNIFICANT CHANGE UP (ref 3.5–5.3)
POTASSIUM SERPL-SCNC: 3.9 MMOL/L — SIGNIFICANT CHANGE UP (ref 3.5–5.3)
PROT SERPL-MCNC: 6.9 G/DL — SIGNIFICANT CHANGE UP (ref 6.6–8.7)
PROT UR-MCNC: 15
RBC # BLD: 3.88 M/UL — SIGNIFICANT CHANGE UP (ref 3.8–5.2)
RBC # FLD: 11.5 % — SIGNIFICANT CHANGE UP (ref 10.3–14.5)
RBC CASTS # UR COMP ASSIST: SIGNIFICANT CHANGE UP /HPF (ref 0–4)
SODIUM SERPL-SCNC: 142 MMOL/L — SIGNIFICANT CHANGE UP (ref 135–145)
SP GR SPEC: 1.02 — SIGNIFICANT CHANGE UP (ref 1.01–1.02)
UROBILINOGEN FLD QL: 4 MG/DL
WBC # BLD: 7.33 K/UL — SIGNIFICANT CHANGE UP (ref 3.8–10.5)
WBC # FLD AUTO: 7.33 K/UL — SIGNIFICANT CHANGE UP (ref 3.8–10.5)
WBC UR QL: SIGNIFICANT CHANGE UP /HPF (ref 0–5)

## 2022-02-02 PROCEDURE — 85025 COMPLETE CBC W/AUTO DIFF WBC: CPT

## 2022-02-02 PROCEDURE — 84702 CHORIONIC GONADOTROPIN TEST: CPT

## 2022-02-02 PROCEDURE — 80053 COMPREHEN METABOLIC PANEL: CPT

## 2022-02-02 PROCEDURE — 76856 US EXAM PELVIC COMPLETE: CPT | Mod: 26

## 2022-02-02 PROCEDURE — 99284 EMERGENCY DEPT VISIT MOD MDM: CPT | Mod: 25

## 2022-02-02 PROCEDURE — 76830 TRANSVAGINAL US NON-OB: CPT | Mod: 26

## 2022-02-02 PROCEDURE — 83690 ASSAY OF LIPASE: CPT

## 2022-02-02 PROCEDURE — 96361 HYDRATE IV INFUSION ADD-ON: CPT

## 2022-02-02 PROCEDURE — 81001 URINALYSIS AUTO W/SCOPE: CPT

## 2022-02-02 PROCEDURE — 96375 TX/PRO/DX INJ NEW DRUG ADDON: CPT

## 2022-02-02 PROCEDURE — 76830 TRANSVAGINAL US NON-OB: CPT

## 2022-02-02 PROCEDURE — 99285 EMERGENCY DEPT VISIT HI MDM: CPT

## 2022-02-02 PROCEDURE — 96374 THER/PROPH/DIAG INJ IV PUSH: CPT

## 2022-02-02 PROCEDURE — 36415 COLL VENOUS BLD VENIPUNCTURE: CPT

## 2022-02-02 PROCEDURE — 87086 URINE CULTURE/COLONY COUNT: CPT

## 2022-02-02 PROCEDURE — 76856 US EXAM PELVIC COMPLETE: CPT

## 2022-02-02 RX ORDER — SODIUM CHLORIDE 9 MG/ML
1000 INJECTION INTRAMUSCULAR; INTRAVENOUS; SUBCUTANEOUS ONCE
Refills: 0 | Status: COMPLETED | OUTPATIENT
Start: 2022-02-02 | End: 2022-02-02

## 2022-02-02 RX ORDER — ONDANSETRON 8 MG/1
4 TABLET, FILM COATED ORAL ONCE
Refills: 0 | Status: COMPLETED | OUTPATIENT
Start: 2022-02-02 | End: 2022-02-02

## 2022-02-02 RX ORDER — KETOROLAC TROMETHAMINE 30 MG/ML
15 SYRINGE (ML) INJECTION ONCE
Refills: 0 | Status: DISCONTINUED | OUTPATIENT
Start: 2022-02-02 | End: 2022-02-02

## 2022-02-02 RX ADMIN — SODIUM CHLORIDE 1000 MILLILITER(S): 9 INJECTION INTRAMUSCULAR; INTRAVENOUS; SUBCUTANEOUS at 17:27

## 2022-02-02 RX ADMIN — SODIUM CHLORIDE 1000 MILLILITER(S): 9 INJECTION INTRAMUSCULAR; INTRAVENOUS; SUBCUTANEOUS at 18:30

## 2022-02-02 RX ADMIN — ONDANSETRON 4 MILLIGRAM(S): 8 TABLET, FILM COATED ORAL at 17:26

## 2022-02-02 RX ADMIN — Medication 15 MILLIGRAM(S): at 22:25

## 2022-02-02 RX ADMIN — Medication 15 MILLIGRAM(S): at 17:26

## 2022-02-02 RX ADMIN — Medication 15 MILLIGRAM(S): at 17:41

## 2022-02-02 NOTE — ED STATDOCS - NS ED ROS FT
Review of Systems  •	CONSTITUTIONAL - no  fever, no diaphoresis, no weight change  •	SKIN - no rash  •	HEMATOLOGIC - no bleeding, no bruising  •	EYES - no eye pain, no blurred vision  •	ENT - no change in hearing, no pain  •	RESPIRATORY - no shortness of breath, no cough  •	CARDIAC - no chest pain, no palpitations  •	GI - + abd pain, + nausea, no vomiting, no diarrhea, no constipation, no bleeding  •	GENITO-URINARY - no discharge, no dysuria; no hematuria,  + urgency,   •	ENDO - no polydipsia, no polyuria, no heat/no cold intolerance  •	MUSCULOSKELETAL - no joint pain, no swelling, no redness, + back pain   •	NEUROLOGIC - no weakness, no headache, no anesthesia, no paresthesias  •	PSYCH - no anxiety, non suicidal, non homicidal, no hallucination, no depression

## 2022-02-02 NOTE — ED ADULT NURSE NOTE - OBJECTIVE STATEMENT
Pt is A&O x4, pt is calm and cooperative, pt had a miscarriage about a month ago and states she is experiencing her first period since, pt states she has a shooting pain in her back and her stomach, pt denies vomiting and diarrhea, pts abdomen is soft and non-distended pt states that she has cysts on her ovaries and that this pain is similar to the pain she feels from the cysts, pt is resting in PRD, pt shows no signs of respiratory distress, will continue to monitor

## 2022-02-02 NOTE — ED STATDOCS - OBJECTIVE STATEMENT
27 y/o female with PMHx of ovarian cyst, had d&c 12/28 c/o RLQ abdominal pain radiating to her right flank. Pt states he has hx of cyst and states on the first day of her period she has a lot pain. Pt states this is first period since October. Pt denies dysuria, but states she has urinary urgency and nausea. Pt denies fever, vomiting.

## 2022-02-02 NOTE — ED STATDOCS - PHYSICAL EXAMINATION
VITAL SIGNS: I have reviewed nursing notes and confirm.  CONSTITUTIONAL:  in no acute distress.  SKIN: Skin exam is warm and dry, no acute rash.  HEAD: Normocephalic; atraumatic.  EYES: PERRL, EOM intact; conjunctiva and sclera clear.  ENT: No nasal discharge; airway clear. Throat clear.  NECK: Supple; non tender.    CARD: Regular rate and rhythm.  RESP: No wheezes,  no rales or rhonchi.   ABD:  soft; non-distended; + suprapubic and right pelvic pain   EXT: Normal ROM. No clubbing, cyanosis or edema.  NEURO: Alert, oriented. Grossly unremarkable. No focal deficits.  moves all extremities,  normal gait   PSYCH: Cooperative, appropriate.

## 2022-02-02 NOTE — ED STATDOCS - CLINICAL SUMMARY MEDICAL DECISION MAKING FREE TEXT BOX
Pt had miscarriage s/p D&C 1 month ago p/w pelvic pain and back pain associated with nausea, will get blood work, pelvic US, IV fluid for hydration, toradol for pain, Zofran for nausea.

## 2022-02-02 NOTE — ED STATDOCS - NSFOLLOWUPINSTRUCTIONS_ED_ALL_ED_FT
Follow up with OBGYN within 1 week   Take motrin every 4 hours for pain     Return if new or worsening symptoms     Abdominal Pain    Many things can cause abdominal pain. Many times, abdominal pain is not caused by a disease and will improve without treatment. Your health care provider will do a physical exam to determine if there is a dangerous cause of your pain; blood tests and imaging may help determine the cause of your pain. However, in many cases, no cause may be found and you may need further testing as an outpatient. Monitor your abdominal pain for any changes.     SEEK IMMEDIATE MEDICAL CARE IF YOU HAVE ANY OF THE FOLLOWING SYMPTOMS: worsening abdominal pain, uncontrollable vomiting, profuse diarrhea, inability to have bowel movements or pass gas, black or bloody stools, fever accompanying chest pain or back pain, or fainting. These symptoms may represent a serious problem that is an emergency. Do not wait to see if the symptoms will go away. Get medical help right away. Call 911 and do not drive yourself to the hospital.

## 2022-02-02 NOTE — ED ADULT NURSE NOTE - NSHOSCREENINGQ1_ED_ALL_ED
----- Message from Therese Mullins sent at 4/15/2019  2:14 PM CDT -----  Contact: Pt's mother  Patient's mother called stating that she will be about 10-15 minutes late.    Patient can be reached at [phone]   No

## 2022-02-02 NOTE — ED STATDOCS - ATTENDING CONTRIBUTION TO CARE
I, Jose Loo, performed the initial face to face bedside interview with this patient regarding history of present illness, review of symptoms and relevant past medical, social and family history.  I completed an independent physical examination.  I was the initial provider who evaluated this patient. I have signed out the follow up of any pending tests (i.e. labs, radiological studies) to the ACP.  I have communicated the patient’s plan of care and disposition with the ACP.

## 2022-02-02 NOTE — ED STATDOCS - PATIENT PORTAL LINK FT
You can access the FollowMyHealth Patient Portal offered by [facility] by registering at the following website: [email or web address]. By joining [employer]’s FollowMyHealth portal, you will also be able to view your health information using other applications (apps) compatible with our system.

## 2022-02-02 NOTE — ED ADULT NURSE REASSESSMENT NOTE - NS ED NURSE REASSESS COMMENT FT1
Assumed care of patient in rw7. Pt a&ox4 rr even and unlabored in rw7 awaiting ultrasound. pt educated on plan of care, pt able to successfully teach back plan of care to RN, RN will continue to reeducate pt during hospital stay.

## 2022-02-02 NOTE — ED ADULT NURSE REASSESSMENT NOTE - NS ED NURSE REASSESS COMMENT FT1
Pt is A&O x4, pt's hands began to cramp and the patient began to get anxious and began to become anxious and hyperventilate, Keturah SCHROEDER made aware and at bedside, hand warmers were placed on hands and a cool paper towel was placed on her head, pt placed on 2L NC as per Keturah SCHROEDER's request, pt is now calm and cooperative, pt shows no signs of respiratory distress, will continue to monitor

## 2022-02-02 NOTE — ED ADULT TRIAGE NOTE - CHIEF COMPLAINT QUOTE
RLQ pain radiating to right flank , hx right sided ovarian cyst, had D/C 1 month ago. denies urinary symptoms

## 2022-02-02 NOTE — ED ADULT NURSE NOTE - NSFALLRSKHARMRISK_ED_ALL_ED
no
02-18 Na139 mmol/L Glu 158 mg/dL<H> K+ 4.2 mmol/L Cr  0.40 mg/dL<L> BUN 22.0 mg/dL<H> Phos n/a   Alb n/a   PAB n/a

## 2022-02-02 NOTE — ED STATDOCS - PROGRESS NOTE DETAILS
Called to bedside by RN. Pt hyperventilating from pain, then began having bilateral hand cramping and diaphoresis. pt placed on 2L nasal canula o2, counseled on deep breathing. pt slowly returned to baseline breathing, hand cramping resolved, she feels much better. will reassess. pt calm, resting with eyes closed, breathing even and unlabored. states she is feeling better.  CBC unremarkable, remainder of labs and US pending. pt admits to symptom relief. Ed valiente

## 2022-02-03 LAB
CULTURE RESULTS: SIGNIFICANT CHANGE UP
SPECIMEN SOURCE: SIGNIFICANT CHANGE UP

## 2022-08-18 NOTE — ED PROVIDER NOTE - NO SIGNIFICANT PAST SURGICAL HISTORY
[Hyperlipidemia] : hyperlipidemia [Hypertension] : hypertension <<----- Click to add NO significant Past Surgical History

## 2022-12-15 NOTE — ED ADULT TRIAGE NOTE - ISOLATION TYPE:
Patient's HM shows they are current for Colorectal Screening. [employer] and  files searched with success. Results attached to order and HM updated. Patient Breast cancer screen HM tab is discontinued.
None

## 2023-06-22 ENCOUNTER — EMERGENCY (EMERGENCY)
Facility: HOSPITAL | Age: 28
LOS: 1 days | Discharge: HOME | End: 2023-06-22
Attending: EMERGENCY MEDICINE
Payer: MEDICAID

## 2023-06-22 VITALS
WEIGHT: 155.21 LBS | RESPIRATION RATE: 20 BRPM | HEART RATE: 75 BPM | DIASTOLIC BLOOD PRESSURE: 119 MMHG | SYSTOLIC BLOOD PRESSURE: 170 MMHG | HEIGHT: 67 IN | OXYGEN SATURATION: 98 % | TEMPERATURE: 98 F

## 2023-06-22 LAB
BASOPHILS # BLD AUTO: 0.05 K/UL — SIGNIFICANT CHANGE UP (ref 0–0.2)
BASOPHILS NFR BLD AUTO: 0.7 % — SIGNIFICANT CHANGE UP (ref 0–2)
EOSINOPHIL # BLD AUTO: 0.13 K/UL — SIGNIFICANT CHANGE UP (ref 0–0.5)
EOSINOPHIL NFR BLD AUTO: 1.8 % — SIGNIFICANT CHANGE UP (ref 0–6)
HCT VFR BLD CALC: 40.1 % — SIGNIFICANT CHANGE UP (ref 34.5–45)
HGB BLD-MCNC: 14.3 G/DL — SIGNIFICANT CHANGE UP (ref 11.5–15.5)
IMM GRANULOCYTES NFR BLD AUTO: 0.1 % — SIGNIFICANT CHANGE UP (ref 0–0.9)
INR BLD: 1.14 RATIO — SIGNIFICANT CHANGE UP (ref 0.88–1.16)
LYMPHOCYTES # BLD AUTO: 2.11 K/UL — SIGNIFICANT CHANGE UP (ref 1–3.3)
LYMPHOCYTES # BLD AUTO: 29 % — SIGNIFICANT CHANGE UP (ref 13–44)
MCHC RBC-ENTMCNC: 35.3 PG — HIGH (ref 27–34)
MCHC RBC-ENTMCNC: 35.7 GM/DL — SIGNIFICANT CHANGE UP (ref 32–36)
MCV RBC AUTO: 99 FL — SIGNIFICANT CHANGE UP (ref 80–100)
MONOCYTES # BLD AUTO: 0.55 K/UL — SIGNIFICANT CHANGE UP (ref 0–0.9)
MONOCYTES NFR BLD AUTO: 7.6 % — SIGNIFICANT CHANGE UP (ref 2–14)
NEUTROPHILS # BLD AUTO: 4.43 K/UL — SIGNIFICANT CHANGE UP (ref 1.8–7.4)
NEUTROPHILS NFR BLD AUTO: 60.8 % — SIGNIFICANT CHANGE UP (ref 43–77)
PLATELET # BLD AUTO: 169 K/UL — SIGNIFICANT CHANGE UP (ref 150–400)
PROTHROM AB SERPL-ACNC: 13.3 SEC — SIGNIFICANT CHANGE UP (ref 10.5–13.4)
RBC # BLD: 4.05 M/UL — SIGNIFICANT CHANGE UP (ref 3.8–5.2)
RBC # FLD: 11.4 % — SIGNIFICANT CHANGE UP (ref 10.3–14.5)
WBC # BLD: 7.28 K/UL — SIGNIFICANT CHANGE UP (ref 3.8–10.5)
WBC # FLD AUTO: 7.28 K/UL — SIGNIFICANT CHANGE UP (ref 3.8–10.5)

## 2023-06-22 PROCEDURE — 99285 EMERGENCY DEPT VISIT HI MDM: CPT

## 2023-06-22 RX ORDER — MORPHINE SULFATE 50 MG/1
4 CAPSULE, EXTENDED RELEASE ORAL ONCE
Refills: 0 | Status: DISCONTINUED | OUTPATIENT
Start: 2023-06-22 | End: 2023-06-22

## 2023-06-22 RX ADMIN — MORPHINE SULFATE 4 MILLIGRAM(S): 50 CAPSULE, EXTENDED RELEASE ORAL at 23:19

## 2023-06-22 NOTE — ED ADULT NURSE NOTE - OBJECTIVE STATEMENT
pt presents for severe R groin pain, happened in the past and it was d/t R cyst on ovary. patient also reporting nausea, put on birth control for symptom control but patient stopped BC 8 months ago. see chart for physical assessment.
97

## 2023-06-22 NOTE — ED ADULT TRIAGE NOTE - ARRIVAL FROM
Bedside and Verbal shift change report given to Charbel (oncoming nurse) by sailaja (offgoing nurse). Report included the following information SBAR, Kardex, Procedure Summary, Intake/Output, MAR and Recent Results. Home

## 2023-06-22 NOTE — ED ADULT NURSE NOTE - NSFALLRISK_ED_ALL_ED
CM following.  Chart reviewed only.  Re-admission score:8%.  Received a call from Dr Romel Payne,plan: home today .  No home care service identified.   No

## 2023-06-22 NOTE — ED ADULT TRIAGE NOTE - CHIEF COMPLAINT QUOTE
Patient presents to ED with c/o right groin pain.  Per patient, she was diagnosed with right ovarian cyst about one year ago and placed on birth control but stopped taking it 8 months ago.  Now presents with severe right groin pain,.  Per patient, feels like I am having contractions.  (+) nausea  Denies pregnancy.

## 2023-06-22 NOTE — ED ADULT NURSE NOTE - NSFALLUNIVINTERV_ED_ALL_ED
Bed/Stretcher in lowest position, wheels locked, appropriate side rails in place/Call bell, personal items and telephone in reach/Instruct patient to call for assistance before getting out of bed/chair/stretcher/Non-slip footwear applied when patient is off stretcher/[place] to call system/Physically safe environment - no spills, clutter or unnecessary equipment/Purposeful proactive rounding/Room/bathroom lighting operational, light cord in reach

## 2023-06-23 VITALS
RESPIRATION RATE: 20 BRPM | TEMPERATURE: 98 F | DIASTOLIC BLOOD PRESSURE: 70 MMHG | HEART RATE: 63 BPM | OXYGEN SATURATION: 99 % | SYSTOLIC BLOOD PRESSURE: 108 MMHG

## 2023-06-23 LAB
ALBUMIN SERPL ELPH-MCNC: 4 G/DL — SIGNIFICANT CHANGE UP (ref 3.3–5.2)
ALP SERPL-CCNC: 74 U/L — SIGNIFICANT CHANGE UP (ref 40–120)
ALT FLD-CCNC: 10 U/L — SIGNIFICANT CHANGE UP
ANION GAP SERPL CALC-SCNC: 15 MMOL/L — SIGNIFICANT CHANGE UP (ref 5–17)
APPEARANCE UR: ABNORMAL
AST SERPL-CCNC: 21 U/L — SIGNIFICANT CHANGE UP
BACTERIA # UR AUTO: ABNORMAL
BILIRUB SERPL-MCNC: 0.8 MG/DL — SIGNIFICANT CHANGE UP (ref 0.4–2)
BILIRUB UR-MCNC: NEGATIVE — SIGNIFICANT CHANGE UP
BLD GP AB SCN SERPL QL: SIGNIFICANT CHANGE UP
BUN SERPL-MCNC: 9.9 MG/DL — SIGNIFICANT CHANGE UP (ref 8–20)
CALCIUM SERPL-MCNC: 9.2 MG/DL — SIGNIFICANT CHANGE UP (ref 8.4–10.5)
CHLORIDE SERPL-SCNC: 105 MMOL/L — SIGNIFICANT CHANGE UP (ref 96–108)
CO2 SERPL-SCNC: 18 MMOL/L — LOW (ref 22–29)
COLOR SPEC: YELLOW — SIGNIFICANT CHANGE UP
CREAT SERPL-MCNC: 0.87 MG/DL — SIGNIFICANT CHANGE UP (ref 0.5–1.3)
DIFF PNL FLD: ABNORMAL
EGFR: 94 ML/MIN/1.73M2 — SIGNIFICANT CHANGE UP
EPI CELLS # UR: ABNORMAL
GLUCOSE SERPL-MCNC: 84 MG/DL — SIGNIFICANT CHANGE UP (ref 70–99)
GLUCOSE UR QL: NEGATIVE MG/DL — SIGNIFICANT CHANGE UP
HCG SERPL-ACNC: <4 MIU/ML — SIGNIFICANT CHANGE UP
KETONES UR-MCNC: ABNORMAL
LEUKOCYTE ESTERASE UR-ACNC: ABNORMAL
LIDOCAIN IGE QN: 48 U/L — SIGNIFICANT CHANGE UP (ref 22–51)
NITRITE UR-MCNC: NEGATIVE — SIGNIFICANT CHANGE UP
PH UR: 9 — HIGH (ref 5–8)
POTASSIUM SERPL-MCNC: 3.6 MMOL/L — SIGNIFICANT CHANGE UP (ref 3.5–5.3)
POTASSIUM SERPL-SCNC: 3.6 MMOL/L — SIGNIFICANT CHANGE UP (ref 3.5–5.3)
PROT SERPL-MCNC: 6.7 G/DL — SIGNIFICANT CHANGE UP (ref 6.6–8.7)
PROT UR-MCNC: 30 MG/DL
RBC CASTS # UR COMP ASSIST: ABNORMAL /HPF (ref 0–4)
SODIUM SERPL-SCNC: 138 MMOL/L — SIGNIFICANT CHANGE UP (ref 135–145)
SP GR SPEC: 1.01 — SIGNIFICANT CHANGE UP (ref 1.01–1.02)
UROBILINOGEN FLD QL: 4 MG/DL
WBC UR QL: SIGNIFICANT CHANGE UP /HPF (ref 0–5)

## 2023-06-23 PROCEDURE — 99284 EMERGENCY DEPT VISIT MOD MDM: CPT | Mod: 25

## 2023-06-23 PROCEDURE — 86900 BLOOD TYPING SEROLOGIC ABO: CPT

## 2023-06-23 PROCEDURE — 76856 US EXAM PELVIC COMPLETE: CPT | Mod: 26

## 2023-06-23 PROCEDURE — 76830 TRANSVAGINAL US NON-OB: CPT

## 2023-06-23 PROCEDURE — 86901 BLOOD TYPING SEROLOGIC RH(D): CPT

## 2023-06-23 PROCEDURE — 74177 CT ABD & PELVIS W/CONTRAST: CPT | Mod: 26,MG

## 2023-06-23 PROCEDURE — 83690 ASSAY OF LIPASE: CPT

## 2023-06-23 PROCEDURE — 87086 URINE CULTURE/COLONY COUNT: CPT

## 2023-06-23 PROCEDURE — 36415 COLL VENOUS BLD VENIPUNCTURE: CPT

## 2023-06-23 PROCEDURE — 96375 TX/PRO/DX INJ NEW DRUG ADDON: CPT

## 2023-06-23 PROCEDURE — 84702 CHORIONIC GONADOTROPIN TEST: CPT

## 2023-06-23 PROCEDURE — 80053 COMPREHEN METABOLIC PANEL: CPT

## 2023-06-23 PROCEDURE — 76830 TRANSVAGINAL US NON-OB: CPT | Mod: 26

## 2023-06-23 PROCEDURE — 85025 COMPLETE CBC W/AUTO DIFF WBC: CPT

## 2023-06-23 PROCEDURE — 76856 US EXAM PELVIC COMPLETE: CPT

## 2023-06-23 PROCEDURE — 86850 RBC ANTIBODY SCREEN: CPT

## 2023-06-23 PROCEDURE — 96374 THER/PROPH/DIAG INJ IV PUSH: CPT | Mod: XU

## 2023-06-23 PROCEDURE — 74177 CT ABD & PELVIS W/CONTRAST: CPT | Mod: MG

## 2023-06-23 PROCEDURE — 81001 URINALYSIS AUTO W/SCOPE: CPT

## 2023-06-23 PROCEDURE — G1004: CPT

## 2023-06-23 PROCEDURE — 85610 PROTHROMBIN TIME: CPT

## 2023-06-23 RX ORDER — KETOROLAC TROMETHAMINE 30 MG/ML
15 SYRINGE (ML) INJECTION ONCE
Refills: 0 | Status: DISCONTINUED | OUTPATIENT
Start: 2023-06-23 | End: 2023-06-23

## 2023-06-23 RX ADMIN — MORPHINE SULFATE 4 MILLIGRAM(S): 50 CAPSULE, EXTENDED RELEASE ORAL at 02:34

## 2023-06-23 RX ADMIN — Medication 15 MILLIGRAM(S): at 02:34

## 2023-06-23 NOTE — ED PROVIDER NOTE - OBJECTIVE STATEMENT
27-year-old female with past medical history of ovarian cysts who presents to the ED complaining of right groin pain.  Patient states that she has had similar pain in the past that has been associated with her ovarian cysts and was placed on birth control but stopped taking it 8 months ago.  Patient states that the pain is intermittent and feels like she is having contractions.  Also endorses nausea.  Denies chance that she could be pregnant.  States that she is due for her menstrual period in a couple of days and her last period was more than a month ago.  Denies any other complaints at this time.

## 2023-06-23 NOTE — ED PROVIDER NOTE - NS ED ROS FT
Constitutional: no fever, no chills  Head: NC, AT   Eyes: no redness   ENMT: no nasal congestion/drainage, no sore throat   CV: no chest pain, no edema  Resp: no cough, no dyspnea  GI: + abdominal pain, + nausea, no vomiting, no diarrhea  : no dysuria, no hematuria   Skin: no lesions, no rashes   Neuro: no LOC, no headache, no sensory deficits, no weakness

## 2023-06-23 NOTE — ED PROVIDER NOTE - NSFOLLOWUPINSTRUCTIONS_ED_ALL_ED_FT
1. Follow up with your ob/gyn within 2-3days for reevaluation.  2.  Return to the Emergency Department immediately for any worsening, progressive or concerning symptoms.   3. Please take Motrin 600mg by mouth every 6 hours as needed for pain. Please take this medication with food.   4. Please take tylenol 650 mg every 4 hours as needed for pain. Please do not exceed more than 4,000mg of Tylenol in a day     Ovarian Cyst    An ovarian cyst is a fluid-filled sac that forms on an ovary. The ovaries are small organs that produce eggs in women. Various types of cysts can form on the ovaries. Some may cause symptoms and require treatment. Most ovarian cysts go away on their own, are not cancerous (are benign), and do not cause problems.    What are the causes?  Ovarian cysts may be caused by:  Ovarian hyperstimulation syndrome. This is a condition that can develop from taking fertility medicines. It causes multiple large ovarian cysts to form.  Polycystic ovarian syndrome (PCOS). This is a common hormonal disorder that can cause ovarian cysts to form, and can cause problems with your period or fertility.  The normal menstrual cycle.  What increases the risk?  The following factors may make you more likely to develop this condition:  Being overweight or obese.  Taking fertility medicines.  Taking certain forms of hormonal birth control.  Smoking.  What are the signs or symptoms?  Many ovarian cysts do not cause symptoms. If symptoms are present, they may include:  Pelvic pain or pressure.  Pain in the lower abdomen.  Pain during sex.  Abdominal swelling.  Abnormal menstrual periods.  Increasing pain with menstrual periods.  How is this diagnosed?  These cysts are commonly found during a routine pelvic exam. You may have tests to find out more about the cyst, such as:  Ultrasound.  CT scan.  MRI.  Blood tests.  How is this treated?  Many ovarian cysts go away on their own without treatment. Your health care provider may want to check your cyst regularly for 2–3 months to see if it changes. If you are in menopause, it is especially important to have your cyst monitored closely because menopausal women have a higher rate of ovarian cancer.    When treatment is needed, it may include:  Medicines to help relieve pain.  A procedure to drain the cyst (aspiration).  Surgery to remove the whole cyst (cystectomy).  Hormone treatment or birth control pills. These methods are sometimes used to help keep cysts from coming back.  Surgery to remove the ovary (oophorectomy).  Follow these instructions at home:  Take over-the-counter and prescription medicines only as told by your health care provider.  Ask your health care provider if any medicine prescribed to you requires you to avoid driving or using machinery.  Get regular pelvic exams and Pap tests as often as told by your health care provider.  Return to your normal activities as told by your health care provider. Ask your health care provider what activities are safe for you.  Do not use any products that contain nicotine or tobacco, such as cigarettes, e-cigarettes, and chewing tobacco. If you need help quitting, ask your health care provider.  Keep all follow-up visits. This is important.  Contact a health care provider if:  Your periods are late, irregular, painful, or they stop.  You have pelvic pain that does not go away.  You have pressure on your bladder or trouble emptying your bladder completely.  You have any of the following:  A feeling of fullness.  You are gaining weight or losing weight without changing your exercise and eating habits.  Pain, swelling, or bloating in the abdomen.  Loss of appetite.  Pain and pressure in your back and pelvis.  You think you may be pregnant.  Get help right away if:  You have abdominal or pelvic pain that is severe or gets worse.  You cannot eat or drink without vomiting.  You suddenly develop a fever or chills.  Your menstrual period is much heavier than usual.  Summary  An ovarian cyst is a fluid-filled sac that forms on an ovary.  Some ovarian cysts may cause symptoms and require treatment.  These cysts are commonly found during a routine pelvic exam.  Many ovarian cysts go away on their own without treatment.  This information is not intended to replace advice given to you by your health care provider. Make sure you discuss any questions you have with your health care provider.

## 2023-06-23 NOTE — ED PROVIDER NOTE - PHYSICAL EXAMINATION
General: NAD  Head: NC, AT  EENT: no scleral icterus  Cardiac: RRR, no apparent murmurs, no lower extremity edema  Respiratory: CTABL, no respiratory distress   Abdomen: soft, ND, RLQ ttp, nonperitonitic  MSK/Vascular: full ROM, soft compartments, warm extremities  Neuro: sensation to light touch intact  Psych: calm, cooperative

## 2023-06-23 NOTE — ED PROVIDER NOTE - ATTENDING CONTRIBUTION TO CARE
I performed a history and physical exam of the patient and discussed their management with the resident. I reviewed the resident's note and agree with the documented findings and plan of care. My medical decision making and observations are found below.     This is a 27-year-old female with past medical history of ovarian cyst presenting with sudden onset of right pelvic pain.  Patient states that she was previously taking oral contraceptives, however stopped taking it about 8 months ago.  On exam, patient is very uncomfortable appearing and tender in the right lower quadrant.  She did not take any medications prior to arrival.  The patient had an ultrasound of her pelvis which showed a corpus luteum cyst with free fluid, she had a CT of her abdomen pelvis which showed no acute findings including no appendicitis.  Labs were reviewed, patient is not pregnant, otherwise nonactionable.  Patient is now feeling better, and is stable for follow-up with OB/GYN.

## 2023-06-23 NOTE — ED ADULT NURSE REASSESSMENT NOTE - NS ED NURSE REASSESS COMMENT FT1
patient given d/c paperwork and return precautions, verbalized understanding. iv d/c, bleeding controlled. patient ambulated out of ed with steady gait, 0/10 pain, neuro intact, resp even and unlabored

## 2023-06-23 NOTE — ED PROVIDER NOTE - PATIENT PORTAL LINK FT
You can access the FollowMyHealth Patient Portal offered by [facility] by registering at the following website: [email or web address]. By joining [employer]’s FollowMyHealth portal, you will also be able to view your health information using other applications (apps) compatible with our system.

## 2023-06-24 LAB
CULTURE RESULTS: SIGNIFICANT CHANGE UP
SPECIMEN SOURCE: SIGNIFICANT CHANGE UP

## 2023-11-13 NOTE — ED ADULT NURSE NOTE - CAS EDN DISCHARGE INTERVENTIONS
Pt needs 30 day refills on her Multaq 400mg and Xarelto 15mg sent to [facility] on [address] in [place]. States she doesn't have enough to get her to her next appt with Dr. Aixa Cat on Nov 30.
Requested Prescriptions     Pending Prescriptions Disp Refills    dronedarone hcl (MULTAQ) 400 MG TABS 60 tablet 0     Sig: Take 1 tablet by mouth 2 times daily (with meals)    rivaroxaban (XARELTO) 15 MG TABS tablet 30 tablet 0     Sig: Take 1 tablet by mouth daily (with breakfast)     Verified rx. Last OV 11/17/2022. Next appointment 11/30/2023. Erx to pharm on file.
IV discontinued, cath removed intact

## 2023-12-21 NOTE — ED ADULT NURSE NOTE - ALCOHOL PRE SCREEN (AUDIT - C)
Statement Selected
NICKI: Patient was reassessed and he states that he is feeling better after the Maalox which is typical for this patient's concerns.  His urine shows no UTI and his previous cultures show that he is sensitive to Cipro which she already finished a course.  This is most likely caused the resolution of his UTI which she was explained to.  Patient is aware and will follow-up outpatient with his PMD for his other concerns.  Will discharge with help with social work back home.  Return precautions explained understood.

## 2024-01-30 NOTE — ED ADULT TRIAGE NOTE - BMI (KG/M2)
Care Transitions Outreach Attempt #2    Call within 2 business days of discharge: Yes   Attempt #2 to reach patient for transitions of care follow up. Unable to reach patient. Phone rings then stops. CTN sign off for transitions.     Patient: Bernard Hollingsworth Patient : 1956 MRN: [id number]    Last Discharge Facility       Date Complaint Diagnosis Description Type Department Provider    24 Abdominal Pain Acute cholecystitis ... ED to Hosp-Admission (Discharged) (ADMITTED) [facility], Jalen Falk MD; [place], ...              Was this an external facility discharge? No Discharge Facility Name: [facility]    Noted following upcoming appointments from discharge chart review:   [facility] follow up appointment(s):   Future Appointments   Date Time Provider Department Center   3/4/2024 12:45 PM Franklin Cobos MD [facility]   3/26/2024  1:00 PM John Hameed MD [facility]     Cheyanne Mcgee RN BSN [facility]  Care Transitions Nurse  [phone]   [email or web address]        
23.5

## 2024-04-06 ENCOUNTER — EMERGENCY (EMERGENCY)
Facility: HOSPITAL | Age: 29
LOS: 1 days | Discharge: HOME | End: 2024-04-06
Attending: EMERGENCY MEDICINE
Payer: MEDICAID

## 2024-04-06 VITALS
SYSTOLIC BLOOD PRESSURE: 129 MMHG | HEART RATE: 84 BPM | TEMPERATURE: 98 F | RESPIRATION RATE: 20 BRPM | OXYGEN SATURATION: 100 % | DIASTOLIC BLOOD PRESSURE: 84 MMHG | WEIGHT: 159.61 LBS

## 2024-04-06 LAB
ALBUMIN SERPL ELPH-MCNC: 3.7 G/DL — SIGNIFICANT CHANGE UP (ref 3.3–5.2)
ALP SERPL-CCNC: 70 U/L — SIGNIFICANT CHANGE UP (ref 40–120)
ALT FLD-CCNC: 15 U/L — SIGNIFICANT CHANGE UP
AMORPH CRY # UR COMP ASSIST: PRESENT
AMPHET UR-MCNC: NEGATIVE — SIGNIFICANT CHANGE UP
ANION GAP SERPL CALC-SCNC: 12 MMOL/L — SIGNIFICANT CHANGE UP (ref 5–17)
APPEARANCE UR: ABNORMAL
AST SERPL-CCNC: 34 U/L — HIGH
BACTERIA # UR AUTO: ABNORMAL /HPF
BARBITURATES UR SCN-MCNC: NEGATIVE — SIGNIFICANT CHANGE UP
BASOPHILS # BLD AUTO: 0.04 K/UL — SIGNIFICANT CHANGE UP (ref 0–0.2)
BASOPHILS NFR BLD AUTO: 0.7 % — SIGNIFICANT CHANGE UP (ref 0–2)
BENZODIAZ UR-MCNC: NEGATIVE — SIGNIFICANT CHANGE UP
BILIRUB SERPL-MCNC: 0.3 MG/DL — LOW (ref 0.4–2)
BILIRUB UR-MCNC: NEGATIVE — SIGNIFICANT CHANGE UP
BUN SERPL-MCNC: 11.8 MG/DL — SIGNIFICANT CHANGE UP (ref 8–20)
CALCIUM SERPL-MCNC: 8.9 MG/DL — SIGNIFICANT CHANGE UP (ref 8.4–10.5)
CAST: 1 /LPF — SIGNIFICANT CHANGE UP (ref 0–4)
CHLORIDE SERPL-SCNC: 106 MMOL/L — SIGNIFICANT CHANGE UP (ref 96–108)
CO2 SERPL-SCNC: 19 MMOL/L — LOW (ref 22–29)
COCAINE METAB.OTHER UR-MCNC: NEGATIVE — SIGNIFICANT CHANGE UP
COD CRY URNS QL: PRESENT
COLOR SPEC: ABNORMAL
CREAT SERPL-MCNC: 0.57 MG/DL — SIGNIFICANT CHANGE UP (ref 0.5–1.3)
DIFF PNL FLD: ABNORMAL
EGFR: 127 ML/MIN/1.73M2 — SIGNIFICANT CHANGE UP
EOSINOPHIL # BLD AUTO: 0.1 K/UL — SIGNIFICANT CHANGE UP (ref 0–0.5)
EOSINOPHIL NFR BLD AUTO: 1.7 % — SIGNIFICANT CHANGE UP (ref 0–6)
GLUCOSE SERPL-MCNC: 93 MG/DL — SIGNIFICANT CHANGE UP (ref 70–99)
GLUCOSE UR QL: NEGATIVE MG/DL — SIGNIFICANT CHANGE UP
HCG SERPL-ACNC: <4 MIU/ML — SIGNIFICANT CHANGE UP
HCT VFR BLD CALC: 35.3 % — SIGNIFICANT CHANGE UP (ref 34.5–45)
HGB BLD-MCNC: 12.5 G/DL — SIGNIFICANT CHANGE UP (ref 11.5–15.5)
IMM GRANULOCYTES NFR BLD AUTO: 0.2 % — SIGNIFICANT CHANGE UP (ref 0–0.9)
KETONES UR-MCNC: ABNORMAL MG/DL
LEUKOCYTE ESTERASE UR-ACNC: ABNORMAL
LYMPHOCYTES # BLD AUTO: 1.42 K/UL — SIGNIFICANT CHANGE UP (ref 1–3.3)
LYMPHOCYTES # BLD AUTO: 24.7 % — SIGNIFICANT CHANGE UP (ref 13–44)
MCHC RBC-ENTMCNC: 34.9 PG — HIGH (ref 27–34)
MCHC RBC-ENTMCNC: 35.4 GM/DL — SIGNIFICANT CHANGE UP (ref 32–36)
MCV RBC AUTO: 98.6 FL — SIGNIFICANT CHANGE UP (ref 80–100)
METHADONE UR-MCNC: NEGATIVE — SIGNIFICANT CHANGE UP
MONOCYTES # BLD AUTO: 0.32 K/UL — SIGNIFICANT CHANGE UP (ref 0–0.9)
MONOCYTES NFR BLD AUTO: 5.6 % — SIGNIFICANT CHANGE UP (ref 2–14)
NEUTROPHILS # BLD AUTO: 3.86 K/UL — SIGNIFICANT CHANGE UP (ref 1.8–7.4)
NEUTROPHILS NFR BLD AUTO: 67.1 % — SIGNIFICANT CHANGE UP (ref 43–77)
NITRITE UR-MCNC: NEGATIVE — SIGNIFICANT CHANGE UP
OPIATES UR-MCNC: NEGATIVE — SIGNIFICANT CHANGE UP
PCP SPEC-MCNC: SIGNIFICANT CHANGE UP
PCP UR-MCNC: NEGATIVE — SIGNIFICANT CHANGE UP
PH UR: 6.5 — SIGNIFICANT CHANGE UP (ref 5–8)
PLATELET # BLD AUTO: 102 K/UL — LOW (ref 150–400)
POTASSIUM SERPL-MCNC: 4.5 MMOL/L — SIGNIFICANT CHANGE UP (ref 3.5–5.3)
POTASSIUM SERPL-SCNC: 4.5 MMOL/L — SIGNIFICANT CHANGE UP (ref 3.5–5.3)
PROT SERPL-MCNC: 6.3 G/DL — LOW (ref 6.6–8.7)
PROT UR-MCNC: 100 MG/DL
RBC # BLD: 3.58 M/UL — LOW (ref 3.8–5.2)
RBC # FLD: 11.7 % — SIGNIFICANT CHANGE UP (ref 10.3–14.5)
RBC CASTS # UR COMP ASSIST: 154 /HPF — HIGH (ref 0–4)
SODIUM SERPL-SCNC: 136 MMOL/L — SIGNIFICANT CHANGE UP (ref 135–145)
SP GR SPEC: >1.03 — HIGH (ref 1–1.03)
SQUAMOUS # UR AUTO: >36 /HPF — HIGH (ref 0–5)
THC UR QL: POSITIVE
UROBILINOGEN FLD QL: 1 MG/DL — SIGNIFICANT CHANGE UP (ref 0.2–1)
WBC # BLD: 6.09 K/UL — SIGNIFICANT CHANGE UP (ref 3.8–10.5)
WBC # FLD AUTO: 6.09 K/UL — SIGNIFICANT CHANGE UP (ref 3.8–10.5)
WBC UR QL: 12 /HPF — HIGH (ref 0–5)

## 2024-04-06 PROCEDURE — 96374 THER/PROPH/DIAG INJ IV PUSH: CPT

## 2024-04-06 PROCEDURE — 84702 CHORIONIC GONADOTROPIN TEST: CPT

## 2024-04-06 PROCEDURE — 36415 COLL VENOUS BLD VENIPUNCTURE: CPT

## 2024-04-06 PROCEDURE — 76830 TRANSVAGINAL US NON-OB: CPT

## 2024-04-06 PROCEDURE — 76856 US EXAM PELVIC COMPLETE: CPT

## 2024-04-06 PROCEDURE — 96375 TX/PRO/DX INJ NEW DRUG ADDON: CPT

## 2024-04-06 PROCEDURE — 76856 US EXAM PELVIC COMPLETE: CPT | Mod: 26

## 2024-04-06 PROCEDURE — 87086 URINE CULTURE/COLONY COUNT: CPT

## 2024-04-06 PROCEDURE — 99284 EMERGENCY DEPT VISIT MOD MDM: CPT

## 2024-04-06 PROCEDURE — 76830 TRANSVAGINAL US NON-OB: CPT | Mod: 26

## 2024-04-06 PROCEDURE — 99284 EMERGENCY DEPT VISIT MOD MDM: CPT | Mod: 25

## 2024-04-06 PROCEDURE — 80053 COMPREHEN METABOLIC PANEL: CPT

## 2024-04-06 PROCEDURE — 81001 URINALYSIS AUTO W/SCOPE: CPT

## 2024-04-06 PROCEDURE — 80307 DRUG TEST PRSMV CHEM ANLYZR: CPT

## 2024-04-06 PROCEDURE — 85025 COMPLETE CBC W/AUTO DIFF WBC: CPT

## 2024-04-06 RX ORDER — KETOROLAC TROMETHAMINE 30 MG/ML
30 SYRINGE (ML) INJECTION ONCE
Refills: 0 | Status: DISCONTINUED | OUTPATIENT
Start: 2024-04-06 | End: 2024-04-06

## 2024-04-06 RX ORDER — ONDANSETRON 8 MG/1
4 TABLET, FILM COATED ORAL ONCE
Refills: 0 | Status: COMPLETED | OUTPATIENT
Start: 2024-04-06 | End: 2024-04-06

## 2024-04-06 RX ORDER — ACETAMINOPHEN 500 MG
1000 TABLET ORAL ONCE
Refills: 0 | Status: COMPLETED | OUTPATIENT
Start: 2024-04-06 | End: 2024-04-06

## 2024-04-06 RX ORDER — SODIUM CHLORIDE 9 MG/ML
1000 INJECTION INTRAMUSCULAR; INTRAVENOUS; SUBCUTANEOUS ONCE
Refills: 0 | Status: COMPLETED | OUTPATIENT
Start: 2024-04-06 | End: 2024-04-06

## 2024-04-06 RX ORDER — METOCLOPRAMIDE HCL 10 MG
10 TABLET ORAL ONCE
Refills: 0 | Status: COMPLETED | OUTPATIENT
Start: 2024-04-06 | End: 2024-04-06

## 2024-04-06 RX ADMIN — SODIUM CHLORIDE 1000 MILLILITER(S): 9 INJECTION INTRAMUSCULAR; INTRAVENOUS; SUBCUTANEOUS at 07:50

## 2024-04-06 RX ADMIN — ONDANSETRON 4 MILLIGRAM(S): 8 TABLET, FILM COATED ORAL at 10:58

## 2024-04-06 RX ADMIN — Medication 30 MILLIGRAM(S): at 10:57

## 2024-04-06 RX ADMIN — Medication 10 MILLIGRAM(S): at 07:50

## 2024-04-06 RX ADMIN — Medication 400 MILLIGRAM(S): at 07:50

## 2024-04-06 NOTE — ED PROVIDER NOTE - OBJECTIVE STATEMENT
This is a 28 year old female p/w pelvic pain consistent to prior history of ruptured ovarian cyst.  She endorses every time she gets her menses she experiences this pain.  She endorses nausea.  She notes no possibility of pregnancy, started period today.  She reports not taking anything for current symptoms.  She denies any allergies.  She admits to smoking THC daily.  She denies any fevers, chills, vomiting, diarrhea, recent travel or rashes.  GYN [place].

## 2024-04-06 NOTE — ED PROVIDER NOTE - PHYSICAL EXAMINATION
Constitutional - well-developed; well nourished. Head - NCAT. Airway patent. Eyes - PERRL. CV - RRR. no murmur. no edema. Pulm - CTAB. Abd - soft, +TTP lower b/l pelvic region, no rebound. no guarding. Neuro - A&Ox3. strength 5/5 x4. sensation intact x4. normal gait. Skin - No rash. MSK - normal ROM.

## 2024-04-06 NOTE — ED PROVIDER NOTE - PROGRESS NOTE DETAILS
US reviewed with patient No sonographic evidence of ovarian torsion. Probable left ovarian 1.4 x   1.4 x 1.5 cm corpus luteal versus small hemorrhagic cyst. Suggest pelvic ultrasound in 6 weeks for reevaluation.

## 2024-04-06 NOTE — ED PROVIDER NOTE - CARE PROVIDER_API CALL
Diana Ochoa  Obstetrics and Gynecology  [address]  [place] [address]  Phone: [phone]  Fax: [phone]  Follow Up Time:

## 2024-04-06 NOTE — ED PROVIDER NOTE - CLINICAL SUMMARY MEDICAL DECISION MAKING FREE TEXT BOX
pelvic pain associated with vaginal bleeding x 1 day  reports similar history to when had ruptured ovarian cyst  check US  check labs  pain control  urine pregnancy  IVF  IV reglan

## 2024-04-06 NOTE — ED PROVIDER NOTE - ATTENDING APP SHARED VISIT CONTRIBUTION OF CARE
I, Alfonso Christianson, performed the initial face to face bedside interview with this patient regarding history of present illness, review of symptoms and relevant past medical, social and family history.  I completed an independent physical examination.  I was the initial provider who evaluated this patient. I have signed out the follow up of any pending tests (i.e. labs, radiological studies) to the ACP.  I have communicated the patient’s plan of care and disposition with the ACP.

## 2024-04-06 NOTE — ED PROVIDER NOTE - PATIENT PORTAL LINK FT
You can access the FollowMyHealth Patient Portal offered by [facility] by registering at the following website: [email or web address]. By joining [employer]s FollowMyHealth portal, you will also be able to view your health information using other applications (apps) compatible with our system.

## 2024-04-06 NOTE — ED ADULT NURSE NOTE - OBJECTIVE STATEMENT
Patient presents to ER C/O lower abdominal pain with sudden onset this AM, patient reports HX of ovarian cysts, denies fever, no vaginal bleeding, reports nausea, resp even/unlabored, no vaginal bleeding.

## 2024-04-06 NOTE — ED PROVIDER NOTE - NS ED ROS FT
No fever/chills, No photophobia/eye pain/changes in vision, No ear pain/sore throat/dysphagia, No chest pain/palpitations, no SOB/cough/wheeze/stridor, +abdominal pain, +N/-V/-D, no dysuria/frequency/discharge, No neck/back pain, no rash, no changes in neurological status/function. Taisha

## 2024-04-06 NOTE — ED PROVIDER NOTE - NSFOLLOWUPINSTRUCTIONS_ED_ALL_ED_FT
Please follow up with GYN    Take tylenol or motrin as needed for pain    Return if symptoms worsen or persist

## 2024-04-06 NOTE — ED ADULT NURSE NOTE - NEGATIVE SCREENING
NOW   Add 10 mg Fluoxitine   To Wellbutrin 300 mg     When 300 mg Wellbutrin done then take 150 mg (new prescription)  And increase Fluoxitine to 2 capsules or 20 mg .         .

## 2024-04-06 NOTE — ED ADULT TRIAGE NOTE - CHIEF COMPLAINT QUOTE
From home awaken by severe lower abdominal pain an hour ago with vaginal bleeding stated that she has Hx of ovarian cyst

## 2024-04-06 NOTE — ED ADULT NURSE NOTE - NSFALLUNIVINTERV_ED_ALL_ED
Bed/Stretcher in lowest position, wheels locked, appropriate side rails in place/Call bell, personal items and telephone in reach/Instruct patient to call for assistance before getting out of bed/chair/stretcher/Non-slip footwear applied when patient is off stretcher/[place] to call system/Physically safe environment - no spills, clutter or unnecessary equipment/Purposeful proactive rounding/Room/bathroom lighting operational, light cord in reach

## 2024-04-07 LAB
CULTURE RESULTS: SIGNIFICANT CHANGE UP
SPECIMEN SOURCE: SIGNIFICANT CHANGE UP

## 2025-05-21 NOTE — ED PROVIDER NOTE - CPE EDP CARDIAC NORM
Continue home levothyroxine 50mcg daily.  TSH 0.408 and T4 0.79 on 5/12.  Recommend repeat TSH in 6 weeks as outpatient.   normal...

## 2025-06-12 NOTE — ED ADULT NURSE NOTE - HIV OFFER
Chart review as [facility] referred pt to this writer. Pt told [facility] that she has a targeted CM with the [facility], so [facility] will no longer reach out.     SHERIE Palacios  [facility] ([facility])   [facility]  [phone]    
Previously Declined (within the last year)